# Patient Record
Sex: FEMALE | Race: WHITE | NOT HISPANIC OR LATINO | Employment: FULL TIME | URBAN - METROPOLITAN AREA
[De-identification: names, ages, dates, MRNs, and addresses within clinical notes are randomized per-mention and may not be internally consistent; named-entity substitution may affect disease eponyms.]

---

## 2017-01-05 ENCOUNTER — OFFICE VISIT (OUTPATIENT)
Dept: UROLOGY | Facility: CLINIC | Age: 42
End: 2017-01-05
Payer: COMMERCIAL

## 2017-01-05 VITALS
RESPIRATION RATE: 14 BRPM | HEART RATE: 70 BPM | WEIGHT: 184.31 LBS | BODY MASS INDEX: 28.93 KG/M2 | DIASTOLIC BLOOD PRESSURE: 78 MMHG | SYSTOLIC BLOOD PRESSURE: 112 MMHG | HEIGHT: 67 IN

## 2017-01-05 DIAGNOSIS — N39.46 MIXED INCONTINENCE URGE AND STRESS: Primary | ICD-10-CM

## 2017-01-05 LAB
BILIRUB SERPL-MCNC: NORMAL MG/DL
BLOOD URINE, POC: NORMAL
COLOR, POC UA: NORMAL
GLUCOSE UR QL STRIP: NORMAL
KETONES UR QL STRIP: NORMAL
LEUKOCYTE ESTERASE URINE, POC: NORMAL
NITRITE, POC UA: NORMAL
PH, POC UA: 7
PROTEIN, POC: NORMAL
SPECIFIC GRAVITY, POC UA: 1000
UROBILINOGEN, POC UA: NORMAL

## 2017-01-05 PROCEDURE — 99244 OFF/OP CNSLTJ NEW/EST MOD 40: CPT | Mod: 25,S$GLB,, | Performed by: UROLOGY

## 2017-01-05 PROCEDURE — 81002 URINALYSIS NONAUTO W/O SCOPE: CPT | Mod: S$GLB,,, | Performed by: UROLOGY

## 2017-01-05 PROCEDURE — 99999 PR PBB SHADOW E&M-EST. PATIENT-LVL IV: CPT | Mod: PBBFAC,,, | Performed by: UROLOGY

## 2017-01-05 RX ORDER — CIPROFLOXACIN 250 MG/1
500 TABLET, FILM COATED ORAL
Status: CANCELLED | OUTPATIENT
Start: 2017-01-05

## 2017-01-05 NOTE — H&P
"  Subjective:       Rad Fraser is a 41 y.o. female who is a new patient who was referred by Dr Osorio for evaluation of BOY.      Urinary Incontinence  Patient complains of urinary incontinence. This has been present for several years but worsening for several months. She leaks urine with coughing, laughing, sneezing, exercise, with urge. Patient describes the symptoms as nocturia 3 times per night, sensation of incomplete emptying of bladder and urine leakage with coughing/heavy physical activity. Factors associated with symptoms include none known. Evaluation to date includes none. Treatment to date includes Kegel exercises, which was not very effective. UUI less of an issue.       The following portions of the patient's history were reviewed and updated as appropriate: allergies, current medications, past family history, past medical history, past social history, past surgical history and problem list.    Review of Systems  Constitutional: no fever or chills  ENT: no nasal congestion or sore throat  Respiratory: no cough or shortness of breath  Cardiovascular: no chest pain or palpitations  Gastrointestinal: no nausea or vomiting, tolerating diet  Genitourinary: as per HPI  Hematologic/Lymphatic: no easy bruising or lymphadenopathy  Musculoskeletal: no arthralgias or myalgias  Skin: no rashes or lesions  Neurological: no seizures or tremors  Behavioral/Psych: no auditory or visual hallucinations       Objective:    Vitals:   Visit Vitals    /78    Pulse 70    Resp 14    Ht 5' 7" (1.702 m)    Wt 83.6 kg (184 lb 4.9 oz)    BMI 28.87 kg/m2       Physical Exam   General: well developed, well nourished in no acute distress  Head: normocephalic, atraumatic  Neck: supple, trachea midline, no obvious enlargement of thyroid  HEENT: EOMI, mucus membranes moist, sclera anicteric, no hearing impairment  Lungs: symmetric expansion, non-labored breathing  Cardiovascular: regular rate and rhythm, normal " pulses  Abdomen: soft, non tender, non distended, no palpable masses, no hepatosplenomegaly, no hernias, no CVA tenderness  Musculoskeletal: no peripheral edema, normal ROM in bilateral upper and lower extremities  Lymphatics: no cervical or inguinal lymphadenopathy  Skin: no rashes or lesions  Neuro: alert and oriented x 3, no gross deficits  Psych: normal judgment and insight, normal mood/affect and non-anxious  Genitourinary:   patient declined exam      Lab Review   Urine analysis today in clinic shows negative for all components     Lab Results   Component Value Date    WBC 5.02 06/15/2015    HGB 14.3 06/15/2015    HCT 41.9 06/15/2015    MCV 91 06/15/2015     06/15/2015     Lab Results   Component Value Date    CREATININE 0.8 06/15/2015    BUN 11 06/15/2015         Imaging  NA         Assessment/Plan:      1. Mixed incontinence urge and stress    - Discussed difference of UUI and BOY components. Reviewed etiology and workup of each.   - BOY: Kegels, PFPT, bulking agent, MUS.   - UUI: Behavioral changes, PFPT, anticholinergics. Botox/InterStim for refractory UUI.   - BOY much more bothersome. UUI rare.   - Likely candidate for MUS   - Cysto/UDS 1/31/17          Follow up in 2 weeks post-op

## 2017-01-05 NOTE — LETTER
January 5, 2017      Gena Osorio MD  [address]  [place] [address]           [facility]rology  [address] [place] [address] [address]  [facility] [address]  Phone: [phone]          Patient: Rad Fraser   MR Number: [id number]   YOB: 1975   Date of Visit: 1/5/2017       Dear Dr. Gena Osorio:    Thank you for referring Rad Fraser to me for evaluation. Attached you will find relevant portions of my assessment and plan of care.    If you have questions, please do not hesitate to call me. I look forward to following Rad Fraser along with you.    Sincerely,    Ruma Kowalski MD    Enclosure  CC:  No Recipients    If you would like to receive this communication electronically, please contact [email or web address] or [phone] to request more information on [employer] access.    For providers and/or their staff who would like to refer a patient to [facility], please contact us through our one-stop-shop provider referral line, [facility], at [phone].    If you feel you have received this communication in error or would no longer like to receive these types of communications, please e-mail [email or web address]

## 2017-01-05 NOTE — MR AVS SNAPSHOT
"    [facility]  [address] [address]  [place] [address]  Phone: [phone]                  Rad Fraser   2017 1:20 PM   Office Visit    Description:  Female : 1975   Provider:  Ruma Kowalski MD   Department:  [facility]           Reason for Visit     Urinary Incontinence           Diagnoses this Visit        Comments    Mixed incontinence urge and stress    -  Primary            To Do List           Goals (5 Years of Data)     None      Follow-Up and Disposition     Return in about 6 weeks (around 2017) for Post-op.      [facility]     [facility] Nurse Care Line -  Assistance  Registered nurses in the [facility] provide clinical advisement, health education, appointment booking, and other advisory services.  Call for this free service at [phone].             Medications           Message regarding Medications     Verify the changes and/or additions to your medication regime listed below are the same as discussed with your clinician today.  If any of these changes or additions are incorrect, please notify your healthcare provider.             Verify that the below list of medications is an accurate representation of the medications you are currently taking.  If none reported, the list may be blank. If incorrect, please contact your healthcare provider. Carry this list with you in case of emergency.           Current Medications     desvenlafaxine succinate (PRISTIQ) 100 MG Tb24 Take 100 mg by mouth once daily.    zolpidem (AMBIEN) 10 mg Tab Take 1 tablet (10 mg total) by mouth nightly as needed.           Clinical Reference Information           Vital Signs - Last Recorded  Most recent update: 2017  1:41 PM by Sherri Hernandez MA    BP Pulse Resp Ht Wt BMI    112/78 70 14 5' 7" (1.702 m) 83.6 kg (184 lb 4.9 oz) 28.87 kg/m2      Blood Pressure          Most Recent Value    BP  112/78      Allergies as of 2017     " No Known Allergies      Immunizations Administered on Date of Encounter - 1/5/2017     None

## 2017-02-03 RX ORDER — IBUPROFEN 100 MG/5ML
1000 SUSPENSION, ORAL (FINAL DOSE FORM) ORAL DAILY
Status: ON HOLD | COMMUNITY
End: 2022-10-11 | Stop reason: HOSPADM

## 2017-02-03 RX ORDER — ALBUTEROL SULFATE 90 UG/1
2 AEROSOL, METERED RESPIRATORY (INHALATION) EVERY 6 HOURS PRN
COMMUNITY

## 2017-02-03 NOTE — PRE ADMISSION SCREENING
Your  Procedure  is scheduled for ___Tuesday 2/7/17_________________.      Please report to SAME DAY SURGERY UNIT on the 2nd FLOOR at _______ a.m.  Use front door entrance before 6 a.m.        INSTRUCTIONS IMPORTANT!!!  ¨   You may eat and take regular morning medications before coming to the hospital.       X____  Prep instructions:    SHOWER    X____  No powder, lotions or creams to surgical area.  X____  You may wear only deodorant on the day of  procedure.  X____  Call MD for temperature above 101 degrees.                 I have read or had read and explained to me, and understand the above information.  Additional comments or instructions  The above instructions reviewed with Mrs. Fraser. I told her I would call her Monday 2/6/17 with her arrival time for the following day. She will be instructed to go to patient registration to register and make any necessary payments  prior to going [facility]. She verbalized understanding of the above.

## 2017-02-07 ENCOUNTER — HOSPITAL ENCOUNTER (OUTPATIENT)
Facility: HOSPITAL | Age: 42
Discharge: HOME | End: 2017-02-07
Attending: UROLOGY | Admitting: UROLOGY
Payer: COMMERCIAL

## 2017-02-07 ENCOUNTER — SURGERY (OUTPATIENT)
Age: 42
End: 2017-02-07

## 2017-02-07 VITALS
DIASTOLIC BLOOD PRESSURE: 70 MMHG | BODY MASS INDEX: 28.88 KG/M2 | OXYGEN SATURATION: 99 % | WEIGHT: 184 LBS | HEIGHT: 67 IN | TEMPERATURE: 97 F | HEART RATE: 68 BPM | SYSTOLIC BLOOD PRESSURE: 117 MMHG | RESPIRATION RATE: 18 BRPM

## 2017-02-07 DIAGNOSIS — N39.46 MIXED INCONTINENCE URGE AND STRESS: Primary | ICD-10-CM

## 2017-02-07 PROCEDURE — 25500020 PHARM REV CODE 255: Performed by: UROLOGY

## 2017-02-07 PROCEDURE — 36000706: Performed by: UROLOGY

## 2017-02-07 PROCEDURE — 51797 INTRAABDOMINAL PRESSURE TEST: CPT | Mod: 26,,, | Performed by: UROLOGY

## 2017-02-07 PROCEDURE — 51728 CYSTOMETROGRAM W/VP: CPT | Mod: 26,,, | Performed by: UROLOGY

## 2017-02-07 PROCEDURE — 76000 FLUOROSCOPY <1 HR PHYS/QHP: CPT | Mod: 26,59,, | Performed by: UROLOGY

## 2017-02-07 PROCEDURE — 36000707: Performed by: UROLOGY

## 2017-02-07 PROCEDURE — 52000 CYSTOURETHROSCOPY: CPT | Mod: 59,,, | Performed by: UROLOGY

## 2017-02-07 PROCEDURE — 25000003 PHARM REV CODE 250: Performed by: UROLOGY

## 2017-02-07 PROCEDURE — 71000015 HC POSTOP RECOV 1ST HR: Performed by: UROLOGY

## 2017-02-07 PROCEDURE — 51784 ANAL/URINARY MUSCLE STUDY: CPT | Mod: 26,51,, | Performed by: UROLOGY

## 2017-02-07 RX ORDER — ACETAMINOPHEN 325 MG/1
650 TABLET ORAL EVERY 4 HOURS PRN
Status: CANCELLED | OUTPATIENT
Start: 2017-02-07

## 2017-02-07 RX ORDER — HYDROCODONE BITARTRATE AND ACETAMINOPHEN 5; 325 MG/1; MG/1
1 TABLET ORAL EVERY 4 HOURS PRN
Status: CANCELLED | OUTPATIENT
Start: 2017-02-07

## 2017-02-07 RX ORDER — CIPROFLOXACIN 500 MG/1
500 TABLET ORAL
Status: COMPLETED | OUTPATIENT
Start: 2017-02-07 | End: 2017-02-07

## 2017-02-07 RX ORDER — ONDANSETRON 2 MG/ML
4 INJECTION INTRAMUSCULAR; INTRAVENOUS EVERY 12 HOURS PRN
Status: CANCELLED | OUTPATIENT
Start: 2017-02-07

## 2017-02-07 RX ORDER — SODIUM CHLORIDE, SODIUM LACTATE, POTASSIUM CHLORIDE, CALCIUM CHLORIDE 600; 310; 30; 20 MG/100ML; MG/100ML; MG/100ML; MG/100ML
INJECTION, SOLUTION INTRAVENOUS CONTINUOUS
Status: DISCONTINUED | OUTPATIENT
Start: 2017-02-07 | End: 2017-02-07 | Stop reason: HOSPADM

## 2017-02-07 RX ORDER — LIDOCAINE HYDROCHLORIDE 10 MG/ML
1 INJECTION, SOLUTION EPIDURAL; INFILTRATION; INTRACAUDAL; PERINEURAL ONCE
Status: DISCONTINUED | OUTPATIENT
Start: 2017-02-07 | End: 2017-02-07 | Stop reason: HOSPADM

## 2017-02-07 RX ORDER — LIDOCAINE HYDROCHLORIDE 20 MG/ML
JELLY TOPICAL
Status: DISCONTINUED | OUTPATIENT
Start: 2017-02-07 | End: 2017-02-07 | Stop reason: HOSPADM

## 2017-02-07 RX ADMIN — IOHEXOL 30 ML: 300 INJECTION, SOLUTION INTRAVENOUS at 02:02

## 2017-02-07 RX ADMIN — CIPROFLOXACIN HYDROCHLORIDE 500 MG: 500 TABLET, FILM COATED ORAL at 01:02

## 2017-02-07 RX ADMIN — LIDOCAINE HYDROCHLORIDE 10 ML: 20 JELLY TOPICAL at 02:02

## 2017-02-07 NOTE — DISCHARGE INSTRUCTIONS
Expect blood and/or burning with urination. Drink plenty of fluids.    Okay to take over-the-counter Azo Standard if needed for burning with urination.

## 2017-02-07 NOTE — IP AVS SNAPSHOT
[address] [address] [place] [address]  Phone: [phone]           Patient Discharge Instructions     Our goal is to set you up for success. This packet includes information on your condition, medications, and your home care. It will help you to care for yourself so you don't get sicker and need to go back to the hospital.     Please ask your nurse if you have any questions.        There are many details to remember when preparing to leave the hospital. Here is what you will need to do:    1. Take your medicine. If you are prescribed medications, review your Medication List in the following pages. You may have new medications to  at the pharmacy and others that you'll need to stop taking. Review the instructions for how and when to take your medications. Talk with your doctor or nurses if you are unsure of what to do.     2. Go to your follow-up appointments. Specific follow-up information is listed in the following pages. Your may be contacted by a transition nurse or clinical provider about future appointments. Be sure we have all of the phone numbers to reach you, if needed. Please contact your provider's office if you are unable to make an appointment.     3. Watch for warning signs. Your doctor or nurse will give you detailed warning signs to watch for and when to call for assistance. These instructions may also include educational information about your condition. If you experience any of warning signs to your health, call your doctor.               [facility]  Unless otherwise directed by your provider, please contact [facility], our nurse care line that is available for 24/7 assistance.     [phone] (toll-free)    Registered nurses in the [facility] provide clinical advisement, health education, appointment booking, and other advisory services.                    ** Verify the list of medication(s) below is accurate and up to date.  Carry this with you in case of emergency. If your medications have changed, please notify your healthcare provider.             Medication List      CONTINUE taking these medications        Additional Info                      biotin 2,500 mcg Tab   Refills:  0   Dose:  5000 mcg    Instructions:  Take 5,000 mcg by mouth once daily.     Begin Date    AM    Noon    PM    Bedtime       desvenlafaxine succinate 100 MG Tb24   Commonly known as:  PRISTIQ   Quantity:  30 tablet   Refills:  5   Dose:  100 mg    Instructions:  Take 100 mg by mouth once daily.     Begin Date    AM    Noon    PM    Bedtime       M-VIT ORAL   Refills:  0   Dose:  1 tablet    Instructions:  Take 1 tablet by mouth once daily.     Begin Date    AM    Noon    PM    Bedtime       PROAIR HFA 90 mcg/actuation inhaler   Refills:  0   Dose:  2 puff   Generic drug:  albuterol    Instructions:  Inhale 2 puffs into the lungs every 6 (six) hours as needed for Wheezing. Rescue     Begin Date    AM    Noon    PM    Bedtime       VITAMIN C 1000 MG tablet   Refills:  0   Dose:  1000 mg   Generic drug:  ascorbic acid (vitamin C)    Instructions:  Take 1,000 mg by mouth once daily.     Begin Date    AM    Noon    PM    Bedtime       zolpidem 10 mg Tab   Commonly known as:  AMBIEN   Quantity:  30 tablet   Refills:  0   Dose:  10 mg    Instructions:  Take 1 tablet (10 mg total) by mouth nightly as needed.     Begin Date    AM    Noon    PM    Bedtime                  Please bring to all follow up appointments:    1. A copy of your discharge instructions.  2. All medicines you are currently taking in their original bottles.  3. Identification and insurance card.    Please arrive 15 minutes ahead of scheduled appointment time.    Please call 24 hours in advance if you must reschedule your appointment and/or time.        Your Scheduled Appointments     Feb 21, 2017  9:20 AM CST   Established Patient Visit with Ruma Kowalski MD   [facility] ([facility])  "   [address] [facility] [address]  [address]  [facility] [address]   [phone]              Follow-up Information     Follow up with Ruma Kowalski MD In 3 weeks.    Specialty:  Urology    Why:  For post-op follow up    Contact information:    [address] [facility] [address]   [address]  [facility] [address]  [phone]          Discharge Instructions     Future Orders    Activity as tolerated     Call MD for:  difficulty breathing, headache or visual disturbances     Call MD for:  persistent nausea and vomiting     Call MD for:  severe uncontrolled pain     Call MD for:  temperature >100.4     Diet general     Questions:    Total calories:      Fat restriction, if any:      Protein restriction, if any:      Na restriction, if any:      Fluid restriction:      Additional restrictions:      No dressing needed         Discharge Instructions       Expect blood and/or burning with urination. Drink plenty of fluids.    Okay to take over-the-counter Azo Standard if needed for burning with urination.     Discharge References/Attachments     URODYNAMICS STUDIES, WHAT ARE (ENGLISH)        Primary Diagnosis     Your primary diagnosis was:  Loss Of Bladder Control      Admission Information     Date & Time Provider Department CSN    2/7/2017 12:55 PM Ruma Kowalski MD [facility] [id number]      Care Providers     Provider Role Specialty Primary office phone    Ruma Kowalski MD Attending Provider Urology [phone]    Ruma Kowalski MD Surgeon  Urology [phone]      Your Vitals Were     BP Pulse Temp Resp Height Weight    117/70 68 97.4 °F (36.3 °C) (Oral) 18 5' 7" (1.702 m) 83.5 kg (184 lb)    Last Period SpO2 BMI          01/30/2017 (Exact Date) 99% 28.82 kg/m2        Recent Lab Values        6/15/2015                           9:01 AM           A1C 5.3                       Allergies as of 2/7/2017        Reactions    No Known Allergies       Advance Directives     An advance directive is a " document which, in the event you are no longer able to make decisions for yourself, tells your healthcare team what kind of treatment you do or do not want to receive, or who you would like to make those decisions for you.  If you do not currently have an advance directive, [facility] encourages you to create one.  For more information call:  [phone]), [phone] ([phone]),  or log on to [email or web address].        Language Assistance Services     ATTENTION: Language assistance services are available, free of charge. Please call [phone].      ATENCIÓN: Si habla jeffery, tiene a beltran disposición servicios gratuitos de asistencia lingüística. Llame al [phone].     CHÚ Ý: N?u b?n nói Ti?ng Vi?t, có các d?ch v? h? tr? ngôn ng? mi?n phí dành cho b?n. G?i s? [phone].         [facility] complies with applicable Federal civil rights laws and does not discriminate on the basis of race, color, national origin, age, disability, or sex.

## 2017-02-07 NOTE — OP NOTE
[facility]  Surgery Department  Operative Note      Date of Procedure:  2017 2:00 PM     Procedure:   1. Complex urodynamics with fluoroscopy  2. Cystoscopy    Surgeon(s) and Role:     * Ruma Kowalski MD - Primary    Assisting Surgeon: None    Pre-Operative Diagnosis: Mixed incontinence urge and stress [788.33]    Post-Operative Diagnosis: Post-Op Diagnosis Codes:     * Mixed incontinence urge and stress [788.33]    Indication for procedure:  Rad Fraser is a 41 y.o. female who is a new patient who was referred by Dr Osorio for evaluation of BOY.      Urinary Incontinence  Patient complains of urinary incontinence. This has been present for several years but worsening for several months. She leaks urine with coughing, laughing, sneezing, exercise, with urge. Patient describes the symptoms as nocturia 3 times per night, sensation of incomplete emptying of bladder and urine leakage with coughing/heavy physical activity. Factors associated with symptoms include none known. Evaluation to date includes none. Treatment to date includes Kegel exercises, which was not very effective. UUI less of an issue.       Description of procedure:  The patient was brought to the urodynamics suite and transferred to the urodynamics chair. Full timeout procedures were performed identifying the correct patient and procedure. Appropriate antibiotics with PO ciprofloxacin were given prior to commencement of surgery A 16-Serbian red rubber catheter was then placed per urethra after genitals were prepped with Betadine solution to remove any residual urine in bladder.  P1 and P2 catheters were placed in the urethra into the bladder and rectally respectively. EMG senses were placed in the appropriate location on perineum and left leg. The bladder was filled at an appropriately slow rate.    PVR pre-procedure: 450mL (2-3 hours after last void, not true PVR)    Filling phase:  Rate of fillin-30 mL/min  First  senation: 112mL  First desire: 227mL  Strong desire: 296mL  Capacity: 808mL  Permission to void given at 697mL    Stress maneuvers (Valsalva and cough) at 150 and 350mL: large leak  VLLP: 85cm H2O  Compliance: normal  ALLP: NA  Involuntary bladder contraction: none    Voiding phase:  Bladder contraction: present, delay of void after bladder contraction  Coordination: EMG flaring  Flow rate (max): 36.0mL/s  Flow rate (avg): 12.6mL/s  Pdet (max flow): 61.0cm H2O  Max Pdet: 76.5cm H2O  Voided volume: 723mL  PVR: 84mL (suspect artifact 2/2 urine missing collection bin)    Fluoroscopy:  Bladder wall: smooth  Voiding: open bladder neck  Vesicoureteral reflux: none  Radiographic PVR: empty bladder      Cystoscopy:  At the conclusion of the urodynamics procedure, P1 and P2 catheters as well as EMG sensors were removed. The patient then was placed in the dorsal lithotomy position and prepped and draped in the usual sterile fashion. Uro-Jet lidocaine was placed in the urethra for aniya-operative analgesia. A 16-Mongolian flexible cystoscope was passed per urethra into the bladder. Full cystoscopy was performed systematically to inspect all aspects of the bladder wall. Retroflexion of the cystoscope was done to inspect the bladder neck. Bilateral UOs were visualized. Urethra was carefully inspected upon removal of cystoscope. The procedure was terminated. The patient tolerated the procedure well.    Urethra: normal  Bladder mucosa: smooth, no lesions/mass  Trabeculation: none  UOs: heaping UOs, orthotopic location      Anesthesia: Local    Findings of the Procedure: Stable filling. No IBC/DO. ++BOY noted with cough. Large capacity bladder with some hesitancy likely due to testing situation. Good bladder contraction with normal flow. Complete emptying.    Complications: none    Estimated Blood Loss (EBL): 0cc          Drains: none    Specimens: none     Attestation: I was present the entirety of the procedure.            Disposition:  Patient is stable and deemed appropriate for discharge home. The patient will follow up in 2-3 weeks.    Recommendations: Stress urinary incontinence - recommend MUS (other options PFPT, pessary, bulking agent).        Discharge Note    SUMMARY     Admit Date:  02/07/2017 3:23 PM    Discharge Date and Time:  02/07/2017 3:23 PM    Hospital Course (synopsis of major diagnoses, care, treatment, and services provided during the course of the hospital stay): Uncomplicated cysto/UDS.     Final Diagnosis: Post-Op Diagnosis Codes:     * Mixed incontinence urge and stress [788.33]    Disposition: Home or Self Care    Follow Up/Patient Instructions:   Expect blood and burning with urination. Drink plenty of fluids.    Medications:  Reconciled Home Medications:   Current Discharge Medication List      CONTINUE these medications which have NOT CHANGED    Details   albuterol (PROAIR HFA) 90 mcg/actuation inhaler Inhale 2 puffs into the lungs every 6 (six) hours as needed for Wheezing. Rescue      ascorbic acid, vitamin C, (VITAMIN C) 1000 MG tablet Take 1,000 mg by mouth once daily.      biotin 2,500 mcg Tab Take 5,000 mcg by mouth once daily.      desvenlafaxine succinate (PRISTIQ) 100 MG Tb24 Take 100 mg by mouth once daily.  Qty: 30 tablet, Refills: 5    Associated Diagnoses: Mixed anxiety and depressive disorder      PV W-O RAAD/FERROUS FUMARATE/FA (M-VIT ORAL) Take 1 tablet by mouth once daily.      zolpidem (AMBIEN) 10 mg Tab Take 1 tablet (10 mg total) by mouth nightly as needed.  Qty: 30 tablet, Refills: 0    Associated Diagnoses: Insomnia, unspecified type             Discharge Procedure Orders  Diet general     Activity as tolerated     Call MD for:  temperature >100.4     Call MD for:  persistent nausea and vomiting     Call MD for:  severe uncontrolled pain     Call MD for:  difficulty breathing, headache or visual disturbances     No dressing needed     Follow-up Information     Follow up with  Ruma Kowalski MD In 2 weeks.    Specialty:  Urology    Why:  For post-op follow up    Contact information:    [address] [place] [id number] [phone]

## 2017-02-20 ENCOUNTER — OFFICE VISIT (OUTPATIENT)
Dept: UROLOGY | Facility: CLINIC | Age: 42
End: 2017-02-20
Payer: COMMERCIAL

## 2017-02-20 VITALS
BODY MASS INDEX: 28.89 KG/M2 | HEART RATE: 60 BPM | DIASTOLIC BLOOD PRESSURE: 76 MMHG | RESPIRATION RATE: 16 BRPM | WEIGHT: 184.06 LBS | HEIGHT: 67 IN | SYSTOLIC BLOOD PRESSURE: 118 MMHG

## 2017-02-20 DIAGNOSIS — N39.46 MIXED INCONTINENCE URGE AND STRESS: Primary | ICD-10-CM

## 2017-02-20 PROCEDURE — 99999 PR PBB SHADOW E&M-EST. PATIENT-LVL IV: CPT | Mod: PBBFAC,,, | Performed by: UROLOGY

## 2017-02-20 PROCEDURE — 99214 OFFICE O/P EST MOD 30 MIN: CPT | Mod: S$GLB,,, | Performed by: UROLOGY

## 2017-02-20 NOTE — H&P
"  Subjective:       Rad Fraser is a 41 y.o. female who is an established patient who was referred by Dr Osorio for evaluation of BOY.      Urinary Incontinence  Patient complains of urinary incontinence. This has been present for several years but worsening for several months. She leaks urine with coughing, laughing, sneezing, exercise, with urge. Patient describes the symptoms as nocturia 3 times per night, sensation of incomplete emptying of bladder and urine leakage with coughing/heavy physical activity. Factors associated with symptoms include none known. Evaluation to date includes none. Treatment to date includes Kegel exercises, which was not very effective. UUI less of an issue.     Cysto/UDS 2/7/17:  Findings of the Procedure: Stable filling. No IBC/DO. ++BOY noted with cough. Large capacity bladder with some hesitancy likely due to testing situation. Good bladder contraction with normal flow. Complete emptying.  Recommendations: Stress urinary incontinence - recommend MUS (other options PFPT, pessary, bulking agent).      The following portions of the patient's history were reviewed and updated as appropriate: allergies, current medications, past family history, past medical history, past social history, past surgical history and problem list.    Review of Systems  Constitutional: no fever or chills  ENT: no nasal congestion or sore throat  Respiratory: no cough or shortness of breath  Cardiovascular: no chest pain or palpitations  Gastrointestinal: no nausea or vomiting, tolerating diet  Genitourinary: as per HPI  Hematologic/Lymphatic: no easy bruising or lymphadenopathy  Musculoskeletal: no arthralgias or myalgias  Skin: no rashes or lesions  Neurological: no seizures or tremors  Behavioral/Psych: no auditory or visual hallucinations       Objective:    Vitals:   Visit Vitals    /76    Pulse 60    Resp 16    Ht 5' 7" (1.702 m)    Wt 83.5 kg (184 lb 1.4 oz)    LMP 01/30/2017 (Exact " Date)    BMI 28.83 kg/m2       Physical Exam   General: well developed, well nourished in no acute distress  Head: normocephalic, atraumatic  Neck: supple, trachea midline, no obvious enlargement of thyroid  HEENT: EOMI, mucus membranes moist, sclera anicteric, no hearing impairment  Lungs: symmetric expansion, non-labored breathing  Cardiovascular: regular rate and rhythm, normal pulses  Abdomen: soft, non tender, non distended, no palpable masses, no hepatosplenomegaly, no hernias, no CVA tenderness  Musculoskeletal: no peripheral edema, normal ROM in bilateral upper and lower extremities  Lymphatics: no cervical or inguinal lymphadenopathy  Skin: no rashes or lesions  Neuro: alert and oriented x 3, no gross deficits  Psych: normal judgment and insight, normal mood/affect and non-anxious  Genitourinary:   patient declined exam      Lab Review   Urine analysis today in clinic shows negative for all components     Lab Results   Component Value Date    WBC 5.02 06/15/2015    HGB 14.3 06/15/2015    HCT 41.9 06/15/2015    MCV 91 06/15/2015     06/15/2015     Lab Results   Component Value Date    CREATININE 0.8 06/15/2015    BUN 11 06/15/2015         Imaging  NA         Assessment/Plan:      1. Mixed incontinence urge and stress    - Discussed difference of UUI and BOY components. Reviewed etiology and workup of each.   - BOY: Kegels, PFPT, bulking agent, MUS.   - UUI: Behavioral changes, PFPT, anticholinergics. Botox/InterStim for refractory UUI.   - BOY much more bothersome. UUI rare.   - Likely candidate for MUS   - Cysto/UDS 1/31/17 - BOY confirmed     - Discussed etiology and treatment of BOY. I explained treatment options of Kegel exercises, pelvic floor physical therapy, and MUS (synthetic vs autologous). We discussed the FDA statement on pelvic mesh, although sling is not included in the statement. I explained the surgical procedure including intra- and post-op expectations. She understands post-op  instructions including no heavy lifting and pelvic rest for 6 weeks. I also explained the risks of the procedure including, but not limited to, bleeding, infection, urinary retention, mesh extrusion/erosion, damage to urethra/bladder/ureters, dyspareunia, and pelvic pain. She understands risks, benefits, and alternatives and agrees to proceed with mid-urethral sling.           Follow up in 2 weeks post-op

## 2017-02-20 NOTE — MR AVS SNAPSHOT
[facility]  [address]  [address]  [place] [address]  Phone: [phone]                  Rad Fraser   2017 2:20 PM   Office Visit    Description:  Female : 1975   Provider:  Ruma Kowalski MD   Department:  [facility]           Reason for Visit     Follow-up           Diagnoses this Visit        Comments    Mixed incontinence urge and stress    -  Primary            To Do List           Goals (5 Years of Data)     None      Follow-Up and Disposition     Return in about 10 weeks (around 2017) for Post-op.      [facility]     [facility] Nurse Care Line -  Assistance  Registered nurses in the [facility] provide clinical advisement, health education, appointment booking, and other advisory services.  Call for this free service at [phone].             Medications           Message regarding Medications     Verify the changes and/or additions to your medication regime listed below are the same as discussed with your clinician today.  If any of these changes or additions are incorrect, please notify your healthcare provider.             Verify that the below list of medications is an accurate representation of the medications you are currently taking.  If none reported, the list may be blank. If incorrect, please contact your healthcare provider. Carry this list with you in case of emergency.           Current Medications     albuterol (PROAIR HFA) 90 mcg/actuation inhaler Inhale 2 puffs into the lungs every 6 (six) hours as needed for Wheezing. Rescue    ascorbic acid, vitamin C, (VITAMIN C) 1000 MG tablet Take 1,000 mg by mouth once daily.    biotin 2,500 mcg Tab Take 5,000 mcg by mouth once daily.    desvenlafaxine succinate (PRISTIQ) 100 MG Tb24 Take 100 mg by mouth once daily.    PV W-O RAAD/FERROUS FUMARATE/FA (M-VIT ORAL) Take 1 tablet by mouth once daily.    zolpidem (AMBIEN) 10 mg Tab Take 1 tablet (10 mg total) by  "mouth nightly as needed.           Clinical Reference Information           Your Vitals Were     BP Pulse Resp Height Weight Last Period    118/76 60 16 5' 7" (1.702 m) 83.5 kg (184 lb 1.4 oz) 01/30/2017 (Exact Date)    BMI                28.83 kg/m2          Blood Pressure          Most Recent Value    BP  118/76      Allergies as of 2/20/2017     No Known Allergies      Immunizations Administered on Date of Encounter - 2/20/2017     None      Language Assistance Services     ATTENTION: Language assistance services are available, free of charge. Please call [phone].      ATENCIÓN: Si habla español, tiene a beltran disposición servicios gratuitos de asistencia lingüística. Llame al [phone].     LESLY Ý: N?u b?n nói Ti?ng Vi?t, có các d?ch v? h? tr? ngôn ng? mi?n phí dành cho b?n. G?i s? [phone].         [facility] complies with applicable Federal civil rights laws and does not discriminate on the basis of race, color, national origin, age, disability, or sex.        "

## 2017-03-02 ENCOUNTER — PATIENT MESSAGE (OUTPATIENT)
Dept: UROLOGY | Facility: CLINIC | Age: 42
End: 2017-03-02

## 2017-03-03 NOTE — TELEPHONE ENCOUNTER
Please check with pt how long she will be out of work for the paperwork. She is restricted to no heavy lifting for 6 weeks post-op

## 2017-03-03 NOTE — TELEPHONE ENCOUNTER
Left Voicemail for patient to return my call. When I hear back i will let you know. I will also send an email to her as well.

## 2017-03-06 ENCOUNTER — PATIENT MESSAGE (OUTPATIENT)
Dept: UROLOGY | Facility: CLINIC | Age: 42
End: 2017-03-06

## 2017-03-07 NOTE — TELEPHONE ENCOUNTER
Patient states that she wants to be out for two weeks then go back to light duty for the remainder (6 weeks post-op). Please let me know when paperwork is ready to be faxed. Thank you.

## 2017-04-18 ENCOUNTER — HOSPITAL ENCOUNTER (OUTPATIENT)
Dept: PREADMISSION TESTING | Facility: HOSPITAL | Age: 42
Discharge: HOME | End: 2017-04-18
Attending: UROLOGY
Payer: COMMERCIAL

## 2017-04-18 VITALS
HEART RATE: 70 BPM | WEIGHT: 194 LBS | HEIGHT: 67 IN | RESPIRATION RATE: 18 BRPM | BODY MASS INDEX: 30.45 KG/M2 | OXYGEN SATURATION: 100 % | TEMPERATURE: 98 F

## 2017-04-18 DIAGNOSIS — Z01.818 PRE-OP TESTING: Primary | ICD-10-CM

## 2017-04-18 LAB
B-HCG UR QL: NEGATIVE
BASOPHILS # BLD AUTO: 0.02 K/UL
BASOPHILS NFR BLD: 0.4 %
DIFFERENTIAL METHOD: NORMAL
EOSINOPHIL # BLD AUTO: 0.2 K/UL
EOSINOPHIL NFR BLD: 4 %
ERYTHROCYTE [DISTWIDTH] IN BLOOD BY AUTOMATED COUNT: 12.5 %
HCT VFR BLD AUTO: 40.7 %
HGB BLD-MCNC: 13.6 G/DL
LYMPHOCYTES # BLD AUTO: 1.4 K/UL
LYMPHOCYTES NFR BLD: 29.3 %
MCH RBC QN AUTO: 30.4 PG
MCHC RBC AUTO-ENTMCNC: 33.4 %
MCV RBC AUTO: 91 FL
MONOCYTES # BLD AUTO: 0.3 K/UL
MONOCYTES NFR BLD: 6.8 %
NEUTROPHILS # BLD AUTO: 2.8 K/UL
NEUTROPHILS NFR BLD: 59.5 %
PLATELET # BLD AUTO: 248 K/UL
PMV BLD AUTO: 10.6 FL
RBC # BLD AUTO: 4.47 M/UL
WBC # BLD AUTO: 4.74 K/UL

## 2017-04-18 PROCEDURE — 85025 COMPLETE CBC W/AUTO DIFF WBC: CPT

## 2017-04-18 PROCEDURE — 81025 URINE PREGNANCY TEST: CPT

## 2017-04-18 PROCEDURE — 36415 COLL VENOUS BLD VENIPUNCTURE: CPT

## 2017-04-18 NOTE — DISCHARGE INSTRUCTIONS
Your surgery is scheduled for _____4/21/2017_______________.    Call [phone] between 2 p.m. and 5 p.m. on   ____4/20/2017___________ to find out your arrival time for the day of your surgery.      Please report to SAME DAY SURGERY UNIT on the 2nd FLOOR at _______ a.m.  Use front door entrance. The doors open at 0530 am.      INSTRUCTIONS IMPORTANT!!!  ¨ Do not eat or drink after 12 midnight-including water. OK to brush teeth, no   gum, candy or mints!    ¨ Take only these medicines with a small swallow of water-morning of surgery.          Albuterol as needed.        _x___  Prep instructions:    SHOWER   OTHER  ______________________  _x___  Please shower using Hibiclens soap the night before AND  the morning of                  your surgery/procedure. Do not use Hibiclens on your face or genitals               If your surgery is around your belly button (Navel) be sure to wash inside your            belly button also. Rinse hibiclens off completely.  _x___  No shaving of procedural area at least 4-5 days before surgery due to                        increased risk of skin irritation and/or possible infection.  _x___  Do not wear makeup, including mascara. WEARING EYE MAKEUP MAY                   LEAD TO SERIOUS EYE INJURY during surgery.  _x___  No powder, lotions or creams to your body.  _x___  You may wear only deodorant on the day of surgery.  _x___  Please remove all jewelry, including piercings and leave at home.  _x___  No money or valuables needed. Please leave at home.  You may bring your           cell phone.  __x__  Please bring any documents given by your doctor.  _x___  If going home the same day, arrange for a ride home. You will not be able to   drive if Anesthesia was used.    __x_  Wear loose fitting clothing. Allow for dressings, bandages.  __x__  Stop Aspirin, Ibuprofen, Motrin and Aleve at least 3-5 days before                       surgery, unless otherwise instructed by your doctor, or the  nurse.              You MAY use Tylenol/acetaminophen until day of surgery.    _x___  Call MD for temperature above 101 degrees.        _x___ Stop taking any Fish Oil supplement or any Vitamins that contain Vitamin                  E at least 5 days prior to surgery.          I have read or had read and explained to me, and understand the above information.  Additional comments or instructions:Please call   [phone] if you have any questions regarding the instructions above.

## 2017-04-18 NOTE — IP AVS SNAPSHOT
[place] [place] [address]  Phone: [phone]           Patient Discharge Instructions  Our goal is to set you up for success. This packet includes information on your condition, medications, and your home care. It will help you care for yourself to prevent having to return to the hospital.     Please ask your nurse if you have any questions.      There are many details to remember when preparing for your surgery. Here is what you will need to do, please ask your nurse if there are more specific instructions and if you have any questions:    1. Before procedure Do not smoke or drink alcoholic beverages 24 hours prior to your procedure. Do not eat or drink anything 8 hours before your procedure - this includes gum, mints, and candy.     2. Day of procedure Please remove all jewelry for the procedure. If you wear contact lenses, dentures, hearing aids or glasses, bring a container to put them in during your surgery and give to a family member.  If your doctor has scheduled you for an overnight stay, bring a small overnight bag with any personal items that you need.      3. After procedure  Make arrangements in advance for transportation home by a responsible adult. It is not safe to drive a vehicle during the 24 hours following surgery.     PLEASE NOTE: You may be contacted the day before your surgery to confirm your surgery date and arrival time. The Surgery schedule has many variables which may affect the time of your surgery case. Family members should be available if your surgery time changes.           [facility]  Unless otherwise directed by your provider, please   contact [facility], our nurse care line   that is available for 24/7 assistance.     [phone] (toll-free)     Registered nurses in the [facility]   provide: appointment scheduling, clinical advisement, health education, and other advisory services.                  ** Verify  the list of medication(s) below is accurate and up to date. Carry this with you in case of emergency. If your medications have changed, please notify your healthcare provider.             Medication List      TAKE these medications        Additional Info                      biotin 2,500 mcg Tab   Refills:  0   Dose:  5000 mcg    Instructions:  Take 5,000 mcg by mouth once daily.     Begin Date    AM    Noon    PM    Bedtime       desvenlafaxine succinate 100 MG Tb24   Commonly known as:  PRISTIQ   Quantity:  30 tablet   Refills:  5   Dose:  100 mg    Instructions:  Take 100 mg by mouth once daily.     Begin Date    AM    Noon    PM    Bedtime       M-VIT ORAL   Refills:  0   Dose:  1 tablet    Instructions:  Take 1 tablet by mouth once daily.     Begin Date    AM    Noon    PM    Bedtime       PROAIR HFA 90 mcg/actuation inhaler   Refills:  0   Dose:  2 puff   Generic drug:  albuterol    Instructions:  Inhale 2 puffs into the lungs every 6 (six) hours as needed for Wheezing. Rescue     Begin Date    AM    Noon    PM    Bedtime       VITAMIN C 1000 MG tablet   Refills:  0   Dose:  1000 mg   Generic drug:  ascorbic acid (vitamin C)    Instructions:  Take 1,000 mg by mouth once daily.     Begin Date    AM    Noon    PM    Bedtime       zolpidem 10 mg Tab   Commonly known as:  AMBIEN   Quantity:  30 tablet   Refills:  0   Dose:  10 mg    Instructions:  Take 1 tablet (10 mg total) by mouth nightly as needed.     Begin Date    AM    Noon    PM    Bedtime                  Please bring to all follow up appointments:    1. A copy of your discharge instructions.  2. All medicines you are currently taking in their original bottles.  3. Identification and insurance card.    Please arrive 15 minutes ahead of scheduled appointment time.    Please call 24 hours in advance if you must reschedule your appointment and/or time.        Your Scheduled Appointments     May 01, 2017 10:20 AM CDT   Post OP with Ruma Kowalski MD    [facility] ([facility])    [address]  [place] [id number] [phone]              Your Future Surgeries/Procedures     Apr 21, 2017   Surgery with Ruma Kowalski MD   [facility])    [place] [id number] [phone]                  Discharge Instructions         Your surgery is scheduled for _____4/21/2017_______________.    Call [phone] between 2 p.m. and 5 p.m. on   ____4/20/2017___________ to find out your arrival time for the day of your surgery.      Please report to SAME DAY SURGERY UNIT on the 2nd FLOOR at _______ a.m.  Use front door entrance. The doors open at 0530 am.      INSTRUCTIONS IMPORTANT!!!  ¨ Do not eat or drink after 12 midnight-including water. OK to brush teeth, no   gum, candy or mints!    ¨ Take only these medicines with a small swallow of water-morning of surgery.          Albuterol as needed.        _x___  Prep instructions:    SHOWER   OTHER  ______________________  _x___  Please shower using Hibiclens soap the night before AND  the morning of                  your surgery/procedure. Do not use Hibiclens on your face or genitals               If your surgery is around your belly button (Navel) be sure to wash inside your            belly button also. Rinse hibiclens off completely.  _x___  No shaving of procedural area at least 4-5 days before surgery due to                        increased risk of skin irritation and/or possible infection.  _x___  Do not wear makeup, including mascara. WEARING EYE MAKEUP MAY                   LEAD TO SERIOUS EYE INJURY during surgery.  _x___  No powder, lotions or creams to your body.  _x___  You may wear only deodorant on the day of surgery.  _x___  Please remove all jewelry, including piercings and leave at home.  _x___  No money or valuables needed. Please leave at home.  You may bring your           cell phone.  __x__  Please bring  "any documents given by your doctor.  _x___  If going home the same day, arrange for a ride home. You will not be able to   drive if Anesthesia was used.    __x_  Wear loose fitting clothing. Allow for dressings, bandages.  __x__  Stop Aspirin, Ibuprofen, Motrin and Aleve at least 3-5 days before                       surgery, unless otherwise instructed by your doctor, or the nurse.              You MAY use Tylenol/acetaminophen until day of surgery.    _x___  Call MD for temperature above 101 degrees.        _x___ Stop taking any Fish Oil supplement or any Vitamins that contain Vitamin                  E at least 5 days prior to surgery.          I have read or had read and explained to me, and understand the above information.  Additional comments or instructions:Please call   [phone] if you have any questions regarding the instructions above.                 Admission Information     Date & Time Provider Department CSN    4/18/2017 10:00 AM Ruma Kowalski MD [facility] [id number]      Care Providers     Provider Role Specialty Primary office phone    Ruma Kowalski MD Attending Provider Urology [phone]      Your Vitals Were     Pulse Temp Resp Height Weight Last Period    70 97.6 °F (36.4 °C) (Oral) 18 5' 7" (1.702 m) 88 kg (194 lb) 04/16/2017    SpO2 BMI             100% 30.38 kg/m2         Recent Lab Values        6/15/2015                           9:01 AM           A1C 5.3                       Allergies as of 4/18/2017        Reactions    No Known Allergies       Advance Directives     An advance directive is a document which, in the event you are no longer able to make decisions for yourself, tells your healthcare team what kind of treatment you do or do not want to receive, or who you would like to make those decisions for you.  If you do not currently have an advance directive, [facility] encourages you to create one.  For more information call:  [phone] " [phone]), [phone] ([phone]),  or log on to [email or web address].        Language Assistance Services     ATTENTION: Language assistance services are available, free of charge. Please call [phone].      ATENCIÓN: Si habla español, tiene a beltran disposición servicios gratuitos de asistencia lingüística. Llame al [phone].     CHÚ Ý: N?u b?n nói Ti?ng Vi?t, có các d?ch v? h? tr? ngôn ng? mi?n phí dành cho b?n. G?i s? [phone].         [facility] complies with applicable Federal civil rights laws and does not discriminate on the basis of race, color, national origin, age, disability, or sex.

## 2017-04-21 ENCOUNTER — ANESTHESIA EVENT (OUTPATIENT)
Dept: SURGERY | Facility: HOSPITAL | Age: 42
End: 2017-04-21
Payer: COMMERCIAL

## 2017-04-21 ENCOUNTER — SURGERY (OUTPATIENT)
Age: 42
End: 2017-04-21

## 2017-04-21 ENCOUNTER — HOSPITAL ENCOUNTER (OUTPATIENT)
Facility: HOSPITAL | Age: 42
Discharge: HOME | End: 2017-04-21
Attending: UROLOGY | Admitting: UROLOGY
Payer: COMMERCIAL

## 2017-04-21 ENCOUNTER — ANESTHESIA (OUTPATIENT)
Dept: SURGERY | Facility: HOSPITAL | Age: 42
End: 2017-04-21
Payer: COMMERCIAL

## 2017-04-21 VITALS
TEMPERATURE: 97 F | DIASTOLIC BLOOD PRESSURE: 76 MMHG | BODY MASS INDEX: 30.45 KG/M2 | HEIGHT: 67 IN | SYSTOLIC BLOOD PRESSURE: 122 MMHG | HEART RATE: 72 BPM | OXYGEN SATURATION: 100 % | WEIGHT: 194 LBS | RESPIRATION RATE: 16 BRPM

## 2017-04-21 DIAGNOSIS — N39.46 MIXED INCONTINENCE URGE AND STRESS: ICD-10-CM

## 2017-04-21 PROCEDURE — 37000008 HC ANESTHESIA 1ST 15 MINUTES: Performed by: UROLOGY

## 2017-04-21 PROCEDURE — 63600175 PHARM REV CODE 636 W HCPCS: Performed by: NURSE ANESTHETIST, CERTIFIED REGISTERED

## 2017-04-21 PROCEDURE — 71000033 HC RECOVERY, INTIAL HOUR: Performed by: UROLOGY

## 2017-04-21 PROCEDURE — 25000003 PHARM REV CODE 250: Performed by: NURSE ANESTHETIST, CERTIFIED REGISTERED

## 2017-04-21 PROCEDURE — D9220A PRA ANESTHESIA: Mod: ANES,,, | Performed by: ANESTHESIOLOGY

## 2017-04-21 PROCEDURE — 71000016 HC POSTOP RECOV ADDL HR: Performed by: UROLOGY

## 2017-04-21 PROCEDURE — 25000003 PHARM REV CODE 250: Performed by: UROLOGY

## 2017-04-21 PROCEDURE — 27200651 HC AIRWAY, LMA: Performed by: NURSE ANESTHETIST, CERTIFIED REGISTERED

## 2017-04-21 PROCEDURE — 25000003 PHARM REV CODE 250

## 2017-04-21 PROCEDURE — 71000015 HC POSTOP RECOV 1ST HR: Performed by: UROLOGY

## 2017-04-21 PROCEDURE — 37000009 HC ANESTHESIA EA ADD 15 MINS: Performed by: UROLOGY

## 2017-04-21 PROCEDURE — C1771 REP DEV, URINARY, W/SLING: HCPCS | Performed by: UROLOGY

## 2017-04-21 PROCEDURE — 57288 REPAIR BLADDER DEFECT: CPT | Mod: ,,, | Performed by: UROLOGY

## 2017-04-21 PROCEDURE — D9220A PRA ANESTHESIA: Mod: CRNA,,, | Performed by: NURSE ANESTHETIST, CERTIFIED REGISTERED

## 2017-04-21 PROCEDURE — 63600175 PHARM REV CODE 636 W HCPCS: Performed by: UROLOGY

## 2017-04-21 PROCEDURE — 36000707: Performed by: UROLOGY

## 2017-04-21 PROCEDURE — 63600175 PHARM REV CODE 636 W HCPCS: Performed by: ANESTHESIOLOGY

## 2017-04-21 PROCEDURE — 36000706: Performed by: UROLOGY

## 2017-04-21 DEVICE — SLING MIDURETHRAL LYNX MESH: Type: IMPLANTABLE DEVICE | Site: VAGINA | Status: FUNCTIONAL

## 2017-04-21 RX ORDER — LORAZEPAM 2 MG/ML
0.25 INJECTION INTRAMUSCULAR ONCE AS NEEDED
Status: DISCONTINUED | OUTPATIENT
Start: 2017-04-21 | End: 2017-04-22 | Stop reason: HOSPADM

## 2017-04-21 RX ORDER — FENTANYL CITRATE 50 UG/ML
INJECTION, SOLUTION INTRAMUSCULAR; INTRAVENOUS
Status: DISCONTINUED | OUTPATIENT
Start: 2017-04-21 | End: 2017-04-21

## 2017-04-21 RX ORDER — FAMOTIDINE 20 MG/50ML
INJECTION, SOLUTION INTRAVENOUS
Status: COMPLETED
Start: 2017-04-21 | End: 2017-04-21

## 2017-04-21 RX ORDER — PROPOFOL 10 MG/ML
VIAL (ML) INTRAVENOUS
Status: DISCONTINUED | OUTPATIENT
Start: 2017-04-21 | End: 2017-04-21

## 2017-04-21 RX ORDER — METOCLOPRAMIDE HYDROCHLORIDE 5 MG/ML
INJECTION INTRAMUSCULAR; INTRAVENOUS
Status: DISCONTINUED | OUTPATIENT
Start: 2017-04-21 | End: 2017-04-21

## 2017-04-21 RX ORDER — DIPHENHYDRAMINE HYDROCHLORIDE 50 MG/ML
25 INJECTION INTRAMUSCULAR; INTRAVENOUS EVERY 6 HOURS PRN
Status: DISCONTINUED | OUTPATIENT
Start: 2017-04-21 | End: 2017-04-22 | Stop reason: HOSPADM

## 2017-04-21 RX ORDER — HYDROCODONE BITARTRATE AND ACETAMINOPHEN 5; 325 MG/1; MG/1
1 TABLET ORAL EVERY 4 HOURS PRN
Status: DISCONTINUED | OUTPATIENT
Start: 2017-04-21 | End: 2017-04-22 | Stop reason: HOSPADM

## 2017-04-21 RX ORDER — HYDROMORPHONE HYDROCHLORIDE 2 MG/ML
0.2 INJECTION, SOLUTION INTRAMUSCULAR; INTRAVENOUS; SUBCUTANEOUS EVERY 5 MIN PRN
Status: DISCONTINUED | OUTPATIENT
Start: 2017-04-21 | End: 2017-04-22 | Stop reason: HOSPADM

## 2017-04-21 RX ORDER — MIDAZOLAM HYDROCHLORIDE 1 MG/ML
INJECTION, SOLUTION INTRAMUSCULAR; INTRAVENOUS
Status: DISCONTINUED | OUTPATIENT
Start: 2017-04-21 | End: 2017-04-21

## 2017-04-21 RX ORDER — ONDANSETRON 2 MG/ML
INJECTION INTRAMUSCULAR; INTRAVENOUS
Status: DISCONTINUED | OUTPATIENT
Start: 2017-04-21 | End: 2017-04-21

## 2017-04-21 RX ORDER — SODIUM CHLORIDE, SODIUM LACTATE, POTASSIUM CHLORIDE, CALCIUM CHLORIDE 600; 310; 30; 20 MG/100ML; MG/100ML; MG/100ML; MG/100ML
INJECTION, SOLUTION INTRAVENOUS CONTINUOUS PRN
Status: DISCONTINUED | OUTPATIENT
Start: 2017-04-21 | End: 2017-04-21

## 2017-04-21 RX ORDER — ONDANSETRON 2 MG/ML
4 INJECTION INTRAMUSCULAR; INTRAVENOUS EVERY 12 HOURS PRN
Status: DISCONTINUED | OUTPATIENT
Start: 2017-04-21 | End: 2017-04-22 | Stop reason: HOSPADM

## 2017-04-21 RX ORDER — SODIUM CHLORIDE, SODIUM LACTATE, POTASSIUM CHLORIDE, CALCIUM CHLORIDE 600; 310; 30; 20 MG/100ML; MG/100ML; MG/100ML; MG/100ML
INJECTION, SOLUTION INTRAVENOUS CONTINUOUS
Status: DISCONTINUED | OUTPATIENT
Start: 2017-04-21 | End: 2017-04-22 | Stop reason: HOSPADM

## 2017-04-21 RX ORDER — BACITRACIN 50000 [IU]/1
INJECTION, POWDER, FOR SOLUTION INTRAMUSCULAR
Status: DISCONTINUED | OUTPATIENT
Start: 2017-04-21 | End: 2017-04-21 | Stop reason: HOSPADM

## 2017-04-21 RX ORDER — MEPERIDINE HYDROCHLORIDE 50 MG/ML
12.5 INJECTION INTRAMUSCULAR; INTRAVENOUS; SUBCUTANEOUS ONCE AS NEEDED
Status: DISCONTINUED | OUTPATIENT
Start: 2017-04-21 | End: 2017-04-22 | Stop reason: HOSPADM

## 2017-04-21 RX ORDER — CEFAZOLIN SODIUM 2 G/50ML
2 SOLUTION INTRAVENOUS
Status: COMPLETED | OUTPATIENT
Start: 2017-04-21 | End: 2017-04-21

## 2017-04-21 RX ORDER — ACETAMINOPHEN 325 MG/1
650 TABLET ORAL EVERY 4 HOURS PRN
Status: DISCONTINUED | OUTPATIENT
Start: 2017-04-21 | End: 2017-04-22 | Stop reason: HOSPADM

## 2017-04-21 RX ORDER — OXYCODONE AND ACETAMINOPHEN 5; 325 MG/1; MG/1
1 TABLET ORAL EVERY 4 HOURS PRN
Qty: 20 TABLET | Refills: 0 | Status: SHIPPED | OUTPATIENT
Start: 2017-04-21 | End: 2017-05-26 | Stop reason: SDUPTHER

## 2017-04-21 RX ORDER — DOCUSATE SODIUM 100 MG/1
100 CAPSULE, LIQUID FILLED ORAL 2 TIMES DAILY
Qty: 30 CAPSULE | Refills: 0 | Status: SHIPPED | OUTPATIENT
Start: 2017-04-21 | End: 2017-05-01 | Stop reason: SDUPTHER

## 2017-04-21 RX ORDER — LIDOCAINE HCL/PF 100 MG/5ML
SYRINGE (ML) INTRAVENOUS
Status: DISCONTINUED | OUTPATIENT
Start: 2017-04-21 | End: 2017-04-21

## 2017-04-21 RX ORDER — HYDROMORPHONE HYDROCHLORIDE 2 MG/ML
0.5 INJECTION, SOLUTION INTRAMUSCULAR; INTRAVENOUS; SUBCUTANEOUS EVERY 4 HOURS PRN
Status: DISCONTINUED | OUTPATIENT
Start: 2017-04-21 | End: 2017-04-22 | Stop reason: HOSPADM

## 2017-04-21 RX ORDER — GLYCOPYRROLATE 0.2 MG/ML
INJECTION INTRAMUSCULAR; INTRAVENOUS
Status: DISCONTINUED | OUTPATIENT
Start: 2017-04-21 | End: 2017-04-21

## 2017-04-21 RX ORDER — CEFAZOLIN SODIUM 2 G/50ML
SOLUTION INTRAVENOUS
Status: DISPENSED
Start: 2017-04-21 | End: 2017-04-21

## 2017-04-21 RX ADMIN — PROPOFOL 50 MG: 10 INJECTION, EMULSION INTRAVENOUS at 08:04

## 2017-04-21 RX ADMIN — PROPOFOL 50 MG: 10 INJECTION, EMULSION INTRAVENOUS at 07:04

## 2017-04-21 RX ADMIN — FENTANYL CITRATE 50 MCG: 50 INJECTION INTRAMUSCULAR; INTRAVENOUS at 07:04

## 2017-04-21 RX ADMIN — METOCLOPRAMIDE 10 MG: 5 INJECTION, SOLUTION INTRAMUSCULAR; INTRAVENOUS at 06:04

## 2017-04-21 RX ADMIN — BACITRACIN 50000 UNITS: 50000 INJECTION, POWDER, FOR SOLUTION INTRAMUSCULAR at 07:04

## 2017-04-21 RX ADMIN — HYDROCODONE BITARTRATE AND ACETAMINOPHEN 1 TABLET: 5; 325 TABLET ORAL at 11:04

## 2017-04-21 RX ADMIN — SODIUM CHLORIDE, SODIUM LACTATE, POTASSIUM CHLORIDE, AND CALCIUM CHLORIDE: .6; .31; .03; .02 INJECTION, SOLUTION INTRAVENOUS at 06:04

## 2017-04-21 RX ADMIN — SODIUM CHLORIDE, SODIUM LACTATE, POTASSIUM CHLORIDE, AND CALCIUM CHLORIDE: .6; .31; .03; .02 INJECTION, SOLUTION INTRAVENOUS at 11:04

## 2017-04-21 RX ADMIN — ONDANSETRON 4 MG: 2 INJECTION, SOLUTION INTRAMUSCULAR; INTRAVENOUS at 07:04

## 2017-04-21 RX ADMIN — PROPOFOL 200 MG: 10 INJECTION, EMULSION INTRAVENOUS at 07:04

## 2017-04-21 RX ADMIN — GLYCOPYRROLATE 0.2 MG: 0.2 INJECTION, SOLUTION INTRAMUSCULAR; INTRAVENOUS at 06:04

## 2017-04-21 RX ADMIN — SODIUM CHLORIDE, SODIUM LACTATE, POTASSIUM CHLORIDE, AND CALCIUM CHLORIDE: .6; .31; .03; .02 INJECTION, SOLUTION INTRAVENOUS at 08:04

## 2017-04-21 RX ADMIN — MIDAZOLAM HYDROCHLORIDE 2 MG: 1 INJECTION, SOLUTION INTRAMUSCULAR; INTRAVENOUS at 06:04

## 2017-04-21 RX ADMIN — HYDROMORPHONE HYDROCHLORIDE 0.2 MG: 2 INJECTION INTRAMUSCULAR; INTRAVENOUS; SUBCUTANEOUS at 09:04

## 2017-04-21 RX ADMIN — CEFAZOLIN SODIUM 2 G: 2 SOLUTION INTRAVENOUS at 07:04

## 2017-04-21 RX ADMIN — LIDOCAINE HYDROCHLORIDE 100 MG: 20 INJECTION, SOLUTION INTRAVENOUS at 07:04

## 2017-04-21 RX ADMIN — FAMOTIDINE 20 MG: 20 INJECTION, SOLUTION INTRAVENOUS at 06:04

## 2017-04-21 NOTE — DISCHARGE INSTRUCTIONS
BATHING/DRESSING:  ? Ok to shower tomorrow    ACTIVITY LEVEL: If you have received sedation or an anesthetic, you may feel sleepy for several hours. Rest until you are more awake. Gradually resume your normal activities.    No heavy lifting.      DIET: You may resume your home diet. If nausea is present, increase your diet gradually with fluids and bland foods.  Medications: Pain medication should be taken only if needed and as directed.You will be given an updated list of you medications.  ? No driving, alcoholic beverages or signing legal documents for next 24 hours or while taking pain medication    CALL THE DOCTOR:    Some blood in your urine is normal.     Redness, swelling, foul smell  or fever over 101.   Shortness of breath, Coughing Up Bloody Sputum, or Pains or Swelling in your Calves..   Persistent pain or nausea not relieved by medication.   Problems urinating - unable to urinate or heavy bleeding (with our without clots) in urine.    If any unusual problems or difficulties occur contact your doctor. If you cannot contact your doctor but feel your signs and symptoms warrant a physicians attention return to the emergency room.  Fall Prevention      Millions of people fall every year and injure themselves. You may have had anesthesia or sedation which may increase your risk of falling. You may have health issues that put you at an increased risk of falling.     Here are ways to reduce your risk of falling.  ·   · Make your home safe by keeping walkways clear of objects you may trip over.  · Use non-slip pads under rugs. Do not use area rugs or small throw rugs.  · Use non-slip mats in bathtubs and showers.  · Install handrails and lights on staircases.  · Do not walk in poorly lit areas.  · Do not stand on chairs or wobbly ladders.  · Use caution when reaching overhead or looking upward. This position can cause a loss of balance.  · Be sure your shoes fit properly, have non-slip bottoms and are in  good condition.   · Wear shoes both inside and out. Avoid going barefoot or wearing slippers.  · Be cautious when going up and down stairs, curbs, and when walking on uneven sidewalks.  · If your balance is poor, consider using a cane or walker.  · If your fall was related to alcohol use, stop or limit alcohol intake.   · If your fall was related to use of sleeping medicines, talk to your doctor about this. You may need to reduce your dosage at bedtime if you awaken during the night to go to the bathroom.    · To reduce the need for nighttime bathroom trips:  ¨ Avoid drinking fluids for several hours before going to bed  ¨ Empty your bladder before going to bed  ¨ Men can keep a urinal at the bedside  · Stay as active as you can. Balance, flexibility, strength, and endurance all come from exercise. They all play a role in preventing falls. Ask your healthcare provider which types of activity are right for you.  · Get your vision checked on a regular basis.  · If you have pets, know where they are before you stand up or walk so you don't trip over them.  Use night lights.No heavy lifting over 10bs for 6 weeks. Minimize bending/squatting. No rigorous/strenuous exercise. Okay to walk/use stairs. Do not strain for bowel movement.     Nothing per vagina for 6 weeks (ie tampons/intercourse). Use pads for any vaginal bleeding.    You got a Vicodin at 11:40 am.

## 2017-04-21 NOTE — IP AVS SNAPSHOT
[place] [address] [place] [address]  Phone: [phone]           Patient Discharge Instructions   Our goal is to set you up for success. This packet includes information on your condition, medications, and your home care.  It will help you care for yourself to prevent having to return to the hospital.     Please ask your nurse if you have any questions.      There are many details to remember when preparing to leave the hospital. Here is what you will need to do:    1. Take your medicine. If you are prescribed medications, review your Medication List on the following pages. You may have new medications to  at the pharmacy and others that you'll need to stop taking. Review the instructions for how and when to take your medications. Talk with your doctor or nurses if you are unsure of what to do.     2. Go to your follow-up appointments. Specific follow-up information is listed in the following pages. Your may be contacted by a nurse or clinical provider about future appointments. Be sure we have all of the phone numbers to reach you. Please contact your provider's office if you are unable to make an appointment.     3. Watch for warning signs. Your doctor or nurse will give you detailed warning signs to watch for and when to call for assistance. These instructions may also include educational information about your condition. If you experience any of warning signs to your health, call your doctor.           [facility]  Unless otherwise directed by your provider, please   contact [facility], our nurse care line   that is available for 24/7 assistance.     [phone] (toll-free)     Registered nurses in the [facility]   provide: appointment scheduling, clinical advisement, health education, and other advisory services.                  ** Verify the list of medication(s) below is accurate and up to date. Carry this with you in case of emergency.  If your medications have changed, please notify your healthcare provider.             Medication List      START taking these medications        Additional Info                      docusate sodium 100 MG capsule   Commonly known as:  COLACE   Quantity:  30 capsule   Refills:  0   Dose:  100 mg    Instructions:  Take 1 capsule (100 mg total) by mouth 2 (two) times daily.     Begin Date    AM    Noon    PM    Bedtime       oxycodone-acetaminophen 5-325 mg per tablet   Commonly known as:  PERCOCET   Quantity:  20 tablet   Refills:  0   Dose:  1 tablet    Instructions:  Take 1 tablet by mouth every 4 (four) hours as needed for Pain.     Begin Date    AM    Noon    PM    Bedtime         CONTINUE taking these medications        Additional Info                      biotin 2,500 mcg Tab   Refills:  0   Dose:  5000 mcg    Instructions:  Take 5,000 mcg by mouth once daily.     Begin Date    AM    Noon    PM    Bedtime       desvenlafaxine succinate 100 MG Tb24   Commonly known as:  PRISTIQ   Quantity:  30 tablet   Refills:  5   Dose:  100 mg    Instructions:  Take 100 mg by mouth once daily.     Begin Date    AM    Noon    PM    Bedtime       M-VIT ORAL   Refills:  0   Dose:  1 tablet    Instructions:  Take 1 tablet by mouth once daily.     Begin Date    AM    Noon    PM    Bedtime       PROAIR HFA 90 mcg/actuation inhaler   Refills:  0   Dose:  2 puff   Generic drug:  albuterol    Instructions:  Inhale 2 puffs into the lungs every 6 (six) hours as needed for Wheezing. Rescue     Begin Date    AM    Noon    PM    Bedtime       VITAMIN C 1000 MG tablet   Refills:  0   Dose:  1000 mg   Generic drug:  ascorbic acid (vitamin C)    Instructions:  Take 1,000 mg by mouth once daily.     Begin Date    AM    Noon    PM    Bedtime       zolpidem 10 mg Tab   Commonly known as:  AMBIEN   Quantity:  30 tablet   Refills:  0   Dose:  10 mg    Instructions:  Take 1 tablet (10 mg total) by mouth nightly as needed.     Begin Date    AM     Noon    PM    Bedtime            Where to Get Your Medications      These medications were sent to [id number] - [place], [place] - [address] [facility] [address]  [address], [facility] [address]    Hours:  24-hours Phone:  [phone]     docusate sodium 100 MG capsule    oxycodone-acetaminophen 5-325 mg per tablet                  Please bring to all follow up appointments:    1. A copy of your discharge instructions.  2. All medicines you are currently taking in their original bottles.  3. Identification and insurance card.    Please arrive 15 minutes ahead of scheduled appointment time.    Please call 24 hours in advance if you must reschedule your appointment and/or time.        Your Scheduled Appointments     May 01, 2017 10:20 AM CDT   Post OP with Ruma Kowalski MD   [facility]    [address] [facility] [address]  [facility] [id number] [phone]              Follow-up Information     Follow up with Ruma Kowalski MD In 2 weeks.    Specialty:  Urology    Why:  For post-op follow up    Contact information:    [address] [place] [address]  [address]  [facility] [address]  [phone]          Discharge Instructions     Future Orders    Call MD for:  difficulty breathing, headache or visual disturbances     Call MD for:  persistent nausea and vomiting     Call MD for:  redness, tenderness, or signs of infection (pain, swelling, redness, odor or green/yellow discharge around incision site)     Call MD for:  severe uncontrolled pain     Call MD for:  temperature >100.4     Diet general     Questions:    Total calories:      Fat restriction, if any:      Protein restriction, if any:      Na restriction, if any:      Fluid restriction:      Additional restrictions:      Lifting restrictions     Comments:    No heavy lifting over 10bs for 6 weeks. Minimize bending/squatting. No rigorous/strenuous exercise. Okay to walk/use stairs. Do not strain for  bowel movement.   Nothing per vagina for 6 weeks (ie tampons/intercourse). Use pads for any vaginal bleeding.    No dressing needed         Discharge Instructions         BATHING/DRESSING:  ? Ok to shower tomorrow    ACTIVITY LEVEL: If you have received sedation or an anesthetic, you may feel sleepy for several hours. Rest until you are more awake. Gradually resume your normal activities.    No heavy lifting.      DIET: You may resume your home diet. If nausea is present, increase your diet gradually with fluids and bland foods.  Medications: Pain medication should be taken only if needed and as directed.You will be given an updated list of you medications.  ? No driving, alcoholic beverages or signing legal documents for next 24 hours or while taking pain medication    CALL THE DOCTOR:    Some blood in your urine is normal.     Redness, swelling, foul smell  or fever over 101.   Shortness of breath, Coughing Up Bloody Sputum, or Pains or Swelling in your Calves..   Persistent pain or nausea not relieved by medication.   Problems urinating - unable to urinate or heavy bleeding (with our without clots) in urine.    If any unusual problems or difficulties occur contact your doctor. If you cannot contact your doctor but feel your signs and symptoms warrant a physicians attention return to the emergency room.  Fall Prevention      Millions of people fall every year and injure themselves. You may have had anesthesia or sedation which may increase your risk of falling. You may have health issues that put you at an increased risk of falling.     Here are ways to reduce your risk of falling.  ·   · Make your home safe by keeping walkways clear of objects you may trip over.  · Use non-slip pads under rugs. Do not use area rugs or small throw rugs.  · Use non-slip mats in bathtubs and showers.  · Install handrails and lights on staircases.  · Do not walk in poorly lit areas.  · Do not stand on chairs or wobbly  ladders.  · Use caution when reaching overhead or looking upward. This position can cause a loss of balance.  · Be sure your shoes fit properly, have non-slip bottoms and are in good condition.   · Wear shoes both inside and out. Avoid going barefoot or wearing slippers.  · Be cautious when going up and down stairs, curbs, and when walking on uneven sidewalks.  · If your balance is poor, consider using a cane or walker.  · If your fall was related to alcohol use, stop or limit alcohol intake.   · If your fall was related to use of sleeping medicines, talk to your doctor about this. You may need to reduce your dosage at bedtime if you awaken during the night to go to the bathroom.    · To reduce the need for nighttime bathroom trips:  ¨ Avoid drinking fluids for several hours before going to bed  ¨ Empty your bladder before going to bed  ¨ Men can keep a urinal at the bedside  · Stay as active as you can. Balance, flexibility, strength, and endurance all come from exercise. They all play a role in preventing falls. Ask your healthcare provider which types of activity are right for you.  · Get your vision checked on a regular basis.  · If you have pets, know where they are before you stand up or walk so you don't trip over them.  Use night lights.No heavy lifting over 10bs for 6 weeks. Minimize bending/squatting. No rigorous/strenuous exercise. Okay to walk/use stairs. Do not strain for bowel movement.     Nothing per vagina for 6 weeks (ie tampons/intercourse). Use pads for any vaginal bleeding.    You got a Vicodin at 11:40 am.      Discharge References/Attachments     MIDURETHRAL SLING SURGERY, HAVING: BOY (ENGLISH)    DOCUSATE CAPSULES (ENGLISH)    ACETAMINOPHEN; OXYCODONE TABLETS (ENGLISH)        Primary Diagnosis     Your primary diagnosis was:  Loss Of Bladder Control      Admission Information     Date & Time Provider Department CSN    4/21/2017  5:43 AM Ruma Kowalski MD [facility]  "[id number]      Care Providers     Provider Role Specialty Primary office phone    Ruma Kowalski MD Attending Provider Urology [phone]    Ruma Kowalski MD Surgeon  Urology [phone]      Your Vitals Were     BP Pulse Temp Resp Height Weight    122/76 72 97.4 °F (36.3 °C) (Axillary) 16 5' 7" (1.702 m) 88 kg (194 lb)    Last Period SpO2 BMI          04/16/2017 100% 30.38 kg/m2        Recent Lab Values        6/15/2015                           9:01 AM           A1C 5.3                       Allergies as of 4/21/2017        Reactions    No Known Allergies       Advance Directives     An advance directive is a document which, in the event you are no longer able to make decisions for yourself, tells your healthcare team what kind of treatment you do or do not want to receive, or who you would like to make those decisions for you.  If you do not currently have an advance directive, [facility] encourages you to create one.  For more information call:  [phone]), [phone] ([phone]),  or log on to [email or web address].        Language Assistance Services     ATTENTION: Language assistance services are available, free of charge. Please call [phone].      ATENCIÓN: Si habla español, tiene a beltran disposición servicios gratuitos de asistencia lingüística. Llame al [phone].     CHÚ Ý: N?u b?n nói Ti?ng Vi?t, có các d?ch v? h? tr? ngôn ng? mi?n phí dành cho b?n. G?i s? [phone].         [facility] complies with applicable Federal civil rights laws and does not discriminate on the basis of race, color, national origin, age, disability, or sex.        "

## 2017-04-21 NOTE — OP NOTE
DATE OF PROCEDURE: 4/21/17     SURGEON: Ruma Kowalski M.D.     PREOPERATIVE DIAGNOSES: Stress urinary incontinence     POSTOPERATIVE DIAGNOSES: Stress urinary incontinence      PROCEDURE PERFORMED: Placement of retropubic mid urethral sling ([employer]), cystoscopy.     INDICATIONS FOR PROCEDURE:   Rad Fraser is a 41 y.o. female who is an established patient who was referred by Dr Osorio for evaluation of BOY.      Urinary Incontinence  Patient complains of urinary incontinence. This has been present for several years but worsening for several months. She leaks urine with coughing, laughing, sneezing, exercise, with urge. Patient describes the symptoms as nocturia 3 times per night, sensation of incomplete emptying of bladder and urine leakage with coughing/heavy physical activity. Factors associated with symptoms include none known. Evaluation to date includes none. Treatment to date includes Kegel exercises, which was not very effective. UUI less of an issue.      Cysto/UDS 2/7/17:  Findings of the Procedure: Stable filling. No IBC/DO. ++BOY noted with cough. Large capacity bladder with some hesitancy likely due to testing situation. Good bladder contraction with normal flow. Complete emptying.  Recommendations: Stress urinary incontinence - recommend MUS (other options PFPT, pessary, bulking agent).     DESCRIPTION OF PROCEDURE: Ms. Fraser was brought to the Operating Room and placed under general endotracheal anesthesia. Full timeout procedures were performed identifying the correct patient and procedure. Appropriate IV antibiotic with Ancef was given prior to commencement of surgery. The patient was placed in dorsal lithotomy position and the surgical area was clipped of hair. The area was then prepped and draped in the usual sterile fashion.     A 16-Prydeinig Zaldivar catheter was placed per urethra into the bladder with 10 mL of sterile water into the balloon. The bladder was drained in its  entirety. Silk sutures were used to secure the drapes to exclude the anus from the field. Retraction sutures were placed bilaterally in the hymenal ring to the drapes. A weighted speculum was placed into the vagina.     A 10 mL of injectable saline were then injected at the midline of mid-urethra with aid of Allis on the superior and distal aspect of the mid urethral area. Injection was  done to hydrodissect to create a plane in the periurethral space. The   15-blade scalpel was then used to make an approximately 2-cm incision in the mid urethral space, approximately 1cm away from urethral meatus. Three Allis clamps were used first on the patient's right side for retraction. Metzenbaum scissors were used to develop periurethral space with a combination of sharp and blunt dissection. This was carried back to the endopelvic fascia to allow placement of my index finger into the periurethral space. Once adequate spacing was accomplished, attention was turned to the contralateral side. Again, 3 Allis clamps were used. Vaginal flap and Metzenbaum scissors were used to create a full thickness vaginal flap. No areas of injury to the urethra were noted throughout this dissection. Once adequate spacing was developed bilaterally allowing the index finger into the periurethral space, the dissection was stopped.     Next, the pubic bone was palpated and marked in the midline area. A 2-cm marks were made on the right and left side of midline and a stab incision was made with the 15-blade scalpel. The retropubic sling trocars were then used first in a perpendicular fashion to the pubic bone until the pubic bone was met with the trocar and then the trocar was angled to allow curling of the trocar around the pubic bone. Again, the bladder was ensured to be drained prior to attempting of this maneuver. Index finger was placed in the periurethral space to allow guidance of the trocar needle into the appropriate position. The needle  was palpated and tracked through the endopelvic fascia and out through the periurethral tunnel that was created previously. This similar procedure was performed on the patient's contralateral side, again was index finger to guide the needle trocar into the appropriate periurethral space after functioning the endopelvic fascia. Once needles were in place, the Zaldivar catheter was removed.     A flexible cystoscope was then placed per urethra into the bladder. Bladder was completely filled and careful cystoscopy was performed. There were no areas of obvious bladder injury. The trocars were unable to be visualized. The trocars were slightly moved in their current position to look for any tenting of the bladder, which again was negative. The urethra was carefully inspected upon removal of the cystoscope and no urethral injuries were noted.  A 16-Jordanian Zaldivar catheter was replaced back into the bladder and 10 mL was placed into the balloon.     Next, we proceeded with mesh placement. The mesh arms were secured to the distal end of the trocars ensuring that the sling was flat and in the appropriate positioning. The trocars were retracted and bringing the mesh up into the retropubic space, the mesh arms were brought out through the retrograde space through the previously placed incision. A Caridad clamp were used for spacing of the sling and the urethra.     Once the mesh was in appropriate position, the blue plastic midline marker was cut and then the plastic sheaths were removed. The sheaths were removed in their entirety while keeping the Caridad clamp spacer in place to prevent over tensioning of the sling. Once the plastic arms were removed, the Toth scissors were removed from the spacing and the sling appeared flat and not over tensioned. The sling was deemed to be in the appropriate position and therefore, the redundant mesh was cut at the skin level and buried in the subcutaneous tissue.     The vaginal incision was then  closed with a running 2-0 Vicryl in a locking position. Next, the retropubic incisions of the abdomen were closed with Dermabond.      Vaginal packing with Premarin cream was then placed into the vagina. The weighted speculum was removed. The retraction sutures were removed.     The patient was then awakened from general anesthesia and transferred to the PACU in stable condition.     COMPLICATIONS: None.     FINDINGS: Normal cystoscopy with no evidence of bladder or urethral injury.     SPECIMENS: None.     DRAINS: A 16-English Zaldivar catheter.     ESTIMATED BLOOD LOSS: 20 mL.     PATIENT DISPOSITION: The patient is stable and will be deemed appropriate for discharge home following voiding trial in the recovery area. She was instructed on postoperative precautions including vaginal rest and no heavy lifting for 6 weeks. She will return in 2 weeks for postoperative check including PVR.

## 2017-04-21 NOTE — ANESTHESIA POSTPROCEDURE EVALUATION
"Anesthesia Post Evaluation    Patient: Rad Fraser    Procedure(s) Performed: Procedure(s) (LRB):  SLING-MID URETHRAL W/ LYNX RETROPUBIC SLING KIT  (N/A)    Final Anesthesia Type: general  Patient location during evaluation: PACU  Patient participation: Yes- Able to Participate  Level of consciousness: awake  Post-procedure vital signs: reviewed and stable  Pain management: adequate  Airway patency: patent  PONV status at discharge: No PONV  Anesthetic complications: no      Cardiovascular status: stable  Respiratory status: unassisted  Hydration status: euvolemic  Follow-up not needed.        Visit Vitals    /73 (BP Location: Left arm, BP Method: Automatic)    Pulse 82    Temp 36.5 °C (97.7 °F) (Axillary)    Resp 17    Ht 5' 7" (1.702 m)    Wt 88 kg (194 lb)    LMP 04/16/2017    SpO2 98%    Breastfeeding No    BMI 30.38 kg/m2       Pain/Ashly Score: Pain Assessment Performed: Yes (4/21/2017  8:54 AM)  Presence of Pain: non-verbal indicators absent (4/21/2017  8:54 AM)  Pain Rating Prior to Med Admin: 5 (4/21/2017  9:32 AM)  Ashly Score: 8 (4/21/2017  9:01 AM)      "

## 2017-04-21 NOTE — ANESTHESIA PREPROCEDURE EVALUATION
04/21/2017  Rad Fraser is a 41 y.o., female.    Anesthesia Evaluation    I have reviewed the Patient Summary Reports.     I have reviewed the Medications.     Review of Systems  Anesthesia Hx:  History of prior surgery of interest to airway management or planning:   Social:  Non-Smoker    Hematology/Oncology:  Hematology Normal        EENT/Dental:   chronic allergic rhinitis   Cardiovascular:  Cardiovascular Normal     Pulmonary:   Asthma    Renal/:  Renal/ Normal     Hepatic/GI:  Hepatic/GI Normal    Psych:   Psychiatric History anxiety depression          Physical Exam  General:  Well nourished    Airway/Jaw/Neck:  Airway Findings: Mouth Opening: Normal Tongue: Normal  General Airway Assessment: Adult  Mallampati: II  TM Distance: 4 - 6 cm  Jaw/Neck Findings:  Neck ROM: Normal ROM      Dental:  Dental Findings: In tact   Chest/Lungs:  Chest/Lungs Findings: Normal Respiratory Rate     Heart/Vascular:  Heart Findings: Rate: Normal        Mental Status:  Mental Status Findings:  Cooperative         Anesthesia Plan  Type of Anesthesia, risks & benefits discussed:  Anesthesia Type:  general  Patient's Preference:   Intra-op Monitoring Plan: standard ASA monitors  Intra-op Monitoring Plan Comments:   Post Op Pain Control Plan:   Post Op Pain Control Plan Comments:   Induction:   IV  Beta Blocker:  Patient is not currently on a Beta-Blocker (No further documentation required).       Informed Consent: Patient understands risks and agrees with Anesthesia plan.  Questions answered. Anesthesia consent signed with patient.  ASA Score: 2     Day of Surgery Review of History & Physical:    H&P update referred to the provider.         Ready For Surgery From Anesthesia Perspective.

## 2017-04-21 NOTE — TRANSFER OF CARE
"Anesthesia Transfer of Care Note    Patient: Rad Fraser    Procedure(s) Performed: Procedure(s) (LRB):  SLING-MID URETHRAL W/ LYNX RETROPUBIC SLING KIT  (N/A)    Patient location: PACU    Anesthesia Type: general    Transport from OR: Transported from OR on room air with adequate spontaneous ventilation    Post pain: adequate analgesia    Post assessment: tolerated procedure well and no apparent anesthetic complications    Post vital signs: stable    Level of consciousness: sedated    Nausea/Vomiting: no nausea/vomiting    Complications: none          Last vitals:   Visit Vitals    /73 (BP Location: Left arm, BP Method: Automatic)    Pulse 82    Temp 36.5 °C (97.7 °F) (Axillary)    Resp 17    Ht 5' 7" (1.702 m)    Wt 88 kg (194 lb)    LMP 04/16/2017    SpO2 98%    Breastfeeding No    BMI 30.38 kg/m2     "

## 2017-04-21 NOTE — BRIEF OP NOTE
[facility]  Brief Operative Note     SUMMARY     Surgery Date: 4/21/2017     Surgeon(s) and Role:     * Ruma Kowlaski MD - Primary    Assisting Surgeon: None    Pre-op Diagnosis:  Mixed incontinence urge and stress [N39.46]    Post-op Diagnosis:  Post-Op Diagnosis Codes:     * Mixed incontinence urge and stress [N39.46]    Procedure(s) (LRB):  SLING-MID URETHRAL W/ LYNX RETROPUBIC SLING KIT  (N/A)    Anesthesia: General    Description of the findings of the procedure: Tension free MUS placement.    Findings/Key Components: Normal cystoscopy - no bladder/urethral injury.    Estimated Blood Loss: 20cc         Specimens:   Specimen     None          Discharge Note    SUMMARY     Admit Date: 4/21/2017    Discharge Date and Time:  04/21/2017 8:53 AM    Hospital Course (synopsis of major diagnoses, care, treatment, and services provided during the course of the hospital stay): Uncomplicated RP-MUS     Final Diagnosis: Post-Op Diagnosis Codes:     * Mixed incontinence urge and stress [N39.46]    Disposition: Home or Self Care    Follow Up/Patient Instructions:     Medications:  Reconciled Home Medications:   Current Discharge Medication List      START taking these medications    Details   docusate sodium (COLACE) 100 MG capsule Take 1 capsule (100 mg total) by mouth 2 (two) times daily.  Qty: 30 capsule, Refills: 0      oxycodone-acetaminophen (PERCOCET) 5-325 mg per tablet Take 1 tablet by mouth every 4 (four) hours as needed for Pain.  Qty: 20 tablet, Refills: 0         CONTINUE these medications which have NOT CHANGED    Details   albuterol (PROAIR HFA) 90 mcg/actuation inhaler Inhale 2 puffs into the lungs every 6 (six) hours as needed for Wheezing. Rescue      desvenlafaxine succinate (PRISTIQ) 100 MG Tb24 Take 100 mg by mouth once daily.  Qty: 30 tablet, Refills: 5    Associated Diagnoses: Mixed anxiety and depressive disorder      ascorbic acid, vitamin C, (VITAMIN C) 1000 MG tablet Take  1,000 mg by mouth once daily.      biotin 2,500 mcg Tab Take 5,000 mcg by mouth once daily.      PV W-O RAAD/FERROUS FUMARATE/FA (M-VIT ORAL) Take 1 tablet by mouth once daily.      zolpidem (AMBIEN) 10 mg Tab Take 1 tablet (10 mg total) by mouth nightly as needed.  Qty: 30 tablet, Refills: 0    Associated Diagnoses: Insomnia, unspecified type             Discharge Procedure Orders  Diet general     Call MD for:  temperature >100.4     Call MD for:  persistent nausea and vomiting     Call MD for:  severe uncontrolled pain     Call MD for:  difficulty breathing, headache or visual disturbances     No dressing needed     Lifting restrictions   Order Comments: No heavy lifting over 10bs for 6 weeks. Minimize bending/squatting. No rigorous/strenuous exercise. Okay to walk/use stairs. Do not strain for bowel movement.   Nothing per vagina for 6 weeks (ie tampons/intercourse). Use pads for any vaginal bleeding.     Call MD for:  redness, tenderness, or signs of infection (pain, swelling, redness, odor or green/yellow discharge around incision site)       Follow-up Information     Follow up with Ruma Kowalski MD In 2 weeks.    Specialty:  Urology    Why:  For post-op follow up    Contact information:    [address] [place], [place] [address] [phone]

## 2017-05-01 ENCOUNTER — OFFICE VISIT (OUTPATIENT)
Dept: UROLOGY | Facility: CLINIC | Age: 42
End: 2017-05-01
Payer: COMMERCIAL

## 2017-05-01 ENCOUNTER — TELEPHONE (OUTPATIENT)
Dept: UROLOGY | Facility: CLINIC | Age: 42
End: 2017-05-01

## 2017-05-01 ENCOUNTER — HOSPITAL ENCOUNTER (OUTPATIENT)
Dept: RADIOLOGY | Facility: HOSPITAL | Age: 42
Discharge: HOME | End: 2017-05-01
Attending: UROLOGY
Payer: COMMERCIAL

## 2017-05-01 VITALS
HEART RATE: 60 BPM | SYSTOLIC BLOOD PRESSURE: 112 MMHG | WEIGHT: 191.81 LBS | HEIGHT: 61 IN | BODY MASS INDEX: 36.21 KG/M2 | RESPIRATION RATE: 16 BRPM | DIASTOLIC BLOOD PRESSURE: 70 MMHG

## 2017-05-01 DIAGNOSIS — R19.8 SUPRAPUBIC FULLNESS: ICD-10-CM

## 2017-05-01 DIAGNOSIS — R19.00 PELVIC FULLNESS IN FEMALE: Primary | ICD-10-CM

## 2017-05-01 DIAGNOSIS — R19.8 SUPRAPUBIC FULLNESS: Primary | ICD-10-CM

## 2017-05-01 PROCEDURE — 99024 POSTOP FOLLOW-UP VISIT: CPT | Mod: S$GLB,,, | Performed by: UROLOGY

## 2017-05-01 PROCEDURE — 74178 CT ABD&PLV WO CNTR FLWD CNTR: CPT | Mod: 26,,, | Performed by: RADIOLOGY

## 2017-05-01 PROCEDURE — 25500020 PHARM REV CODE 255: Performed by: UROLOGY

## 2017-05-01 PROCEDURE — 51700 IRRIGATION OF BLADDER: CPT | Mod: 58,S$GLB,, | Performed by: UROLOGY

## 2017-05-01 PROCEDURE — 99999 PR PBB SHADOW E&M-EST. PATIENT-LVL IV: CPT | Mod: 25,PBBFAC,, | Performed by: UROLOGY

## 2017-05-01 PROCEDURE — 74178 CT ABD&PLV WO CNTR FLWD CNTR: CPT | Mod: TC

## 2017-05-01 RX ORDER — DOCUSATE SODIUM 100 MG/1
100 CAPSULE, LIQUID FILLED ORAL 2 TIMES DAILY
Qty: 30 CAPSULE | Refills: 0 | Status: SHIPPED | OUTPATIENT
Start: 2017-05-01 | End: 2017-05-22 | Stop reason: SDUPTHER

## 2017-05-01 RX ORDER — CIPROFLOXACIN 2 MG/ML
400 INJECTION, SOLUTION INTRAVENOUS
Status: CANCELLED | OUTPATIENT
Start: 2017-05-01

## 2017-05-01 RX ADMIN — IOHEXOL 125 ML: 350 INJECTION, SOLUTION INTRAVENOUS at 02:05

## 2017-05-01 NOTE — PROGRESS NOTES
Subjective:       Rad Fraser is a 41 y.o. female who is an established patient who was referred by Dr Osorio for evaluation of BOY.      Urinary Incontinence  Patient complains of urinary incontinence. This has been present for several years but worsening for several months. She leaks urine with coughing, laughing, sneezing, exercise, with urge. Patient describes the symptoms as nocturia 3 times per night, sensation of incomplete emptying of bladder and urine leakage with coughing/heavy physical activity. Factors associated with symptoms include none known. Evaluation to date includes none. Treatment to date includes Kegel exercises, which was not very effective. UUI less of an issue.     Cysto/UDS 2/7/17:  Findings of the Procedure: Stable filling. No IBC/DO. ++BOY noted with cough. Large capacity bladder with some hesitancy likely due to testing situation. Good bladder contraction with normal flow. Complete emptying.  Recommendations: Stress urinary incontinence - recommend MUS (other options PFPT, pessary, bulking agent).    She is now s/p RP-MUS on 4/21/17. She reports no issues with leakage but is having slow stream.     PVR (bladder scan) today - >691cc, void then >912cc.       The following portions of the patient's history were reviewed and updated as appropriate: allergies, current medications, past family history, past medical history, past social history, past surgical history and problem list.    Review of Systems  Constitutional: no fever or chills  ENT: no nasal congestion or sore throat  Respiratory: no cough or shortness of breath  Cardiovascular: no chest pain or palpitations  Gastrointestinal: no nausea or vomiting, tolerating diet  Genitourinary: as per HPI  Hematologic/Lymphatic: no easy bruising or lymphadenopathy  Musculoskeletal: no arthralgias or myalgias  Skin: no rashes or lesions  Neurological: no seizures or tremors  Behavioral/Psych: no auditory or visual hallucinations      "  Objective:    Vitals:   Pulse 60  Resp 16  Ht 5' 1" (1.549 m)  Wt 87 kg (191 lb 12.8 oz)  LMP 04/16/2017  BMI 36.24 kg/m2    Physical Exam   General: well developed, well nourished in no acute distress  Head: normocephalic, atraumatic  Neck: supple, trachea midline, no obvious enlargement of thyroid  HEENT: EOMI, mucus membranes moist, sclera anicteric, no hearing impairment  Lungs: symmetric expansion, non-labored breathing  Cardiovascular: regular rate and rhythm, normal pulses  Abdomen: soft, non tender, non distended, no palpable masses, no hepatosplenomegaly, no hernias, no CVA tenderness  Musculoskeletal: no peripheral edema, normal ROM in bilateral upper and lower extremities  Lymphatics: no cervical or inguinal lymphadenopathy  Skin: no rashes or lesions  Neuro: alert and oriented x 3, no gross deficits  Psych: normal judgment and insight, normal mood/affect and non-anxious  Genitourinary:   Breast - deferred. External genitalia - no masses or lesions, normal hair distribution.  Urethral meatus - orthotopic location without lesion or prolapse.  Urethra - no lesions palpated, non tender, no discharge, no diverticula noted.  Bladder - palpable distention in SP area. Sutures in incision. Minimal UOP with passage of catheter but SP area remains distended.    16Fr winston passed into bladder with sterile technique. Bladder irrigated with NS. Clear yellow urine. Minimal UOP.       Lab Review   Urine analysis today in clinic shows negative for all components     Lab Results   Component Value Date    WBC 4.74 04/18/2017    HGB 13.6 04/18/2017    HCT 40.7 04/18/2017    MCV 91 04/18/2017     04/18/2017     Lab Results   Component Value Date    CREATININE 0.8 06/15/2015    BUN 11 06/15/2015         Imaging  Images and reports were personally reviewed by me and discussed with patient    Cystoscopy:  No bladder or urethral injury noted. Clear efflux from L ureter. No efflux noted from R.    CT uro:  Large " pelvic fluid collection.       Assessment/Plan:      1. Mixed incontinence urge and stress    - Discussed difference of UUI and BOY components. Reviewed etiology and workup of each.   - BOY: Kegels, PFPT, bulking agent, MUS.   - UUI: Behavioral changes, PFPT, anticholinergics. Botox/InterStim for refractory UUI.   - BOY much more bothersome. UUI rare.   - Cysto/UDS 1/31/17 - BOY confirmed   - s/p RP-MUS 4/21/17   - Suspected UR 2/2 high bladder scan PVR - attempted CIC teaching but minimal UOP   - Cystoscopy negative for bladder injury   - CT uro STAT - large pelvic fluid collection, unable to r/o R ureteral injury, ?ovarian mass   - Recommend cysto/RPG tomorrow in OR          Follow up in 1 week

## 2017-05-01 NOTE — TELEPHONE ENCOUNTER
"----- Message from Cheyenne Ortiz sent at 5/1/2017  2:14 PM CDT -----  Contact: Patient herself  X  1st Request  _  2nd Request  _  3rd Request    Who: Rad Fraser (mrn# [id number])    Why: Patient called to make aware, "she's still down stairs waiting to have the CT scan but intends to come back up as soon as she's finishes."   THANKS!    What Number to Call Back:  [phone]    When to Expect a call back: (Before the end of the day)   -- if the call is after 12:00, the call back will be tomorrow.                        "

## 2017-05-02 ENCOUNTER — SURGERY (OUTPATIENT)
Age: 42
End: 2017-05-02

## 2017-05-02 ENCOUNTER — ANESTHESIA (OUTPATIENT)
Dept: SURGERY | Facility: HOSPITAL | Age: 42
End: 2017-05-02
Payer: COMMERCIAL

## 2017-05-02 ENCOUNTER — ANESTHESIA EVENT (OUTPATIENT)
Dept: SURGERY | Facility: HOSPITAL | Age: 42
End: 2017-05-02
Payer: COMMERCIAL

## 2017-05-02 ENCOUNTER — HOSPITAL ENCOUNTER (OUTPATIENT)
Facility: HOSPITAL | Age: 42
LOS: 1 days | Discharge: HOME | End: 2017-05-03
Attending: UROLOGY | Admitting: UROLOGY
Payer: COMMERCIAL

## 2017-05-02 ENCOUNTER — HOSPITAL ENCOUNTER (OUTPATIENT)
Dept: PREADMISSION TESTING | Facility: HOSPITAL | Age: 42
Discharge: HOME | End: 2017-05-02
Attending: UROLOGY | Admitting: UROLOGY
Payer: COMMERCIAL

## 2017-05-02 VITALS
TEMPERATURE: 97 F | SYSTOLIC BLOOD PRESSURE: 117 MMHG | DIASTOLIC BLOOD PRESSURE: 82 MMHG | BODY MASS INDEX: 29.86 KG/M2 | HEART RATE: 61 BPM | WEIGHT: 190.25 LBS | HEIGHT: 67 IN | RESPIRATION RATE: 16 BRPM | OXYGEN SATURATION: 99 %

## 2017-05-02 DIAGNOSIS — R19.8 SUPRAPUBIC FULLNESS: Primary | ICD-10-CM

## 2017-05-02 LAB
CREAT SERPL-MCNC: 1 MG/DL
EST. GFR  (AFRICAN AMERICAN): >60 ML/MIN/1.73 M^2
EST. GFR  (NON AFRICAN AMERICAN): >60 ML/MIN/1.73 M^2

## 2017-05-02 PROCEDURE — 25500020 PHARM REV CODE 255: Performed by: UROLOGY

## 2017-05-02 PROCEDURE — 52351 CYSTOURETERO & OR PYELOSCOPE: CPT | Mod: 79,RT,, | Performed by: UROLOGY

## 2017-05-02 PROCEDURE — 74420 UROGRAPHY RTRGR +-KUB: CPT | Mod: 26,,, | Performed by: UROLOGY

## 2017-05-02 PROCEDURE — 71000039 HC RECOVERY, EACH ADD'L HOUR: Performed by: UROLOGY

## 2017-05-02 PROCEDURE — 82565 ASSAY OF CREATININE: CPT

## 2017-05-02 PROCEDURE — 63600175 PHARM REV CODE 636 W HCPCS: Performed by: NURSE ANESTHETIST, CERTIFIED REGISTERED

## 2017-05-02 PROCEDURE — 37000008 HC ANESTHESIA 1ST 15 MINUTES: Performed by: UROLOGY

## 2017-05-02 PROCEDURE — 63600175 PHARM REV CODE 636 W HCPCS

## 2017-05-02 PROCEDURE — 27200651 HC AIRWAY, LMA: Performed by: NURSE ANESTHETIST, CERTIFIED REGISTERED

## 2017-05-02 PROCEDURE — 76000 FLUOROSCOPY <1 HR PHYS/QHP: CPT | Mod: 26,59,, | Performed by: UROLOGY

## 2017-05-02 PROCEDURE — C1758 CATHETER, URETERAL: HCPCS | Performed by: UROLOGY

## 2017-05-02 PROCEDURE — 25000003 PHARM REV CODE 250: Performed by: ANESTHESIOLOGY

## 2017-05-02 PROCEDURE — 36415 COLL VENOUS BLD VENIPUNCTURE: CPT

## 2017-05-02 PROCEDURE — 63600175 PHARM REV CODE 636 W HCPCS: Performed by: UROLOGY

## 2017-05-02 PROCEDURE — D9220A PRA ANESTHESIA: Mod: ANES,,, | Performed by: ANESTHESIOLOGY

## 2017-05-02 PROCEDURE — 88108 CYTOPATH CONCENTRATE TECH: CPT | Performed by: PATHOLOGY

## 2017-05-02 PROCEDURE — 25000003 PHARM REV CODE 250: Performed by: UROLOGY

## 2017-05-02 PROCEDURE — 12000002 HC ACUTE/MED SURGE SEMI-PRIVATE ROOM

## 2017-05-02 PROCEDURE — 25000003 PHARM REV CODE 250: Performed by: NURSE ANESTHETIST, CERTIFIED REGISTERED

## 2017-05-02 PROCEDURE — 52332 CYSTOSCOPY AND TREATMENT: CPT | Mod: 79,51,RT, | Performed by: UROLOGY

## 2017-05-02 PROCEDURE — 63600175 PHARM REV CODE 636 W HCPCS: Performed by: ANESTHESIOLOGY

## 2017-05-02 PROCEDURE — 49082 ABD PARACENTESIS: CPT | Mod: 79,51,, | Performed by: UROLOGY

## 2017-05-02 PROCEDURE — C1769 GUIDE WIRE: HCPCS | Performed by: UROLOGY

## 2017-05-02 PROCEDURE — 82570 ASSAY OF URINE CREATININE: CPT

## 2017-05-02 PROCEDURE — 36000707: Performed by: UROLOGY

## 2017-05-02 PROCEDURE — D9220A PRA ANESTHESIA: Mod: CRNA,,, | Performed by: NURSE ANESTHETIST, CERTIFIED REGISTERED

## 2017-05-02 PROCEDURE — 51600 INJECTION FOR BLADDER X-RAY: CPT | Mod: 79,51,, | Performed by: UROLOGY

## 2017-05-02 PROCEDURE — 88108 CYTOPATH CONCENTRATE TECH: CPT | Mod: 26,,, | Performed by: PATHOLOGY

## 2017-05-02 PROCEDURE — 36000706: Performed by: UROLOGY

## 2017-05-02 PROCEDURE — C2617 STENT, NON-COR, TEM W/O DEL: HCPCS | Performed by: UROLOGY

## 2017-05-02 PROCEDURE — 71000033 HC RECOVERY, INTIAL HOUR: Performed by: UROLOGY

## 2017-05-02 PROCEDURE — 25000003 PHARM REV CODE 250

## 2017-05-02 PROCEDURE — 37000009 HC ANESTHESIA EA ADD 15 MINS: Performed by: UROLOGY

## 2017-05-02 DEVICE — STENT PERCUFLEX 6 X 24: Type: IMPLANTABLE DEVICE | Site: URETER | Status: NON-FUNCTIONAL

## 2017-05-02 RX ORDER — HYDROCODONE BITARTRATE AND ACETAMINOPHEN 5; 325 MG/1; MG/1
1 TABLET ORAL EVERY 4 HOURS PRN
Status: DISCONTINUED | OUTPATIENT
Start: 2017-05-02 | End: 2017-05-03 | Stop reason: HOSPADM

## 2017-05-02 RX ORDER — MIDAZOLAM HYDROCHLORIDE 1 MG/ML
INJECTION INTRAMUSCULAR; INTRAVENOUS
Status: COMPLETED
Start: 2017-05-02 | End: 2017-05-02

## 2017-05-02 RX ORDER — PHENAZOPYRIDINE HYDROCHLORIDE 100 MG/1
200 TABLET, FILM COATED ORAL 3 TIMES DAILY PRN
Status: DISCONTINUED | OUTPATIENT
Start: 2017-05-02 | End: 2017-05-03 | Stop reason: HOSPADM

## 2017-05-02 RX ORDER — GLYCOPYRROLATE 0.2 MG/ML
INJECTION INTRAMUSCULAR; INTRAVENOUS
Status: COMPLETED
Start: 2017-05-02 | End: 2017-05-02

## 2017-05-02 RX ORDER — OXYBUTYNIN CHLORIDE 5 MG/1
5 TABLET ORAL 3 TIMES DAILY PRN
Status: DISCONTINUED | OUTPATIENT
Start: 2017-05-02 | End: 2017-05-03 | Stop reason: HOSPADM

## 2017-05-02 RX ORDER — ONDANSETRON 2 MG/ML
4 INJECTION INTRAMUSCULAR; INTRAVENOUS EVERY 8 HOURS PRN
Status: DISCONTINUED | OUTPATIENT
Start: 2017-05-02 | End: 2017-05-03 | Stop reason: HOSPADM

## 2017-05-02 RX ORDER — ZOLPIDEM TARTRATE 5 MG/1
10 TABLET ORAL NIGHTLY PRN
Status: DISCONTINUED | OUTPATIENT
Start: 2017-05-02 | End: 2017-05-03 | Stop reason: HOSPADM

## 2017-05-02 RX ORDER — DOCUSATE SODIUM 100 MG/1
100 CAPSULE, LIQUID FILLED ORAL 2 TIMES DAILY
Status: DISCONTINUED | OUTPATIENT
Start: 2017-05-02 | End: 2017-05-03 | Stop reason: HOSPADM

## 2017-05-02 RX ORDER — MEPERIDINE HYDROCHLORIDE 50 MG/ML
12.5 INJECTION INTRAMUSCULAR; INTRAVENOUS; SUBCUTANEOUS ONCE AS NEEDED
Status: DISCONTINUED | OUTPATIENT
Start: 2017-05-02 | End: 2017-05-02 | Stop reason: HOSPADM

## 2017-05-02 RX ORDER — CIPROFLOXACIN 2 MG/ML
400 INJECTION, SOLUTION INTRAVENOUS
Status: DISCONTINUED | OUTPATIENT
Start: 2017-05-02 | End: 2017-05-02

## 2017-05-02 RX ORDER — OXYCODONE HYDROCHLORIDE 5 MG/1
10 TABLET ORAL EVERY 4 HOURS PRN
Status: DISCONTINUED | OUTPATIENT
Start: 2017-05-02 | End: 2017-05-03 | Stop reason: HOSPADM

## 2017-05-02 RX ORDER — SODIUM CHLORIDE 0.9 % (FLUSH) 0.9 %
3 SYRINGE (ML) INJECTION EVERY 8 HOURS
Status: DISCONTINUED | OUTPATIENT
Start: 2017-05-02 | End: 2017-05-03 | Stop reason: HOSPADM

## 2017-05-02 RX ORDER — FENTANYL CITRATE 50 UG/ML
INJECTION, SOLUTION INTRAMUSCULAR; INTRAVENOUS
Status: DISCONTINUED | OUTPATIENT
Start: 2017-05-02 | End: 2017-05-02

## 2017-05-02 RX ORDER — HYDROMORPHONE HYDROCHLORIDE 2 MG/ML
0.2 INJECTION, SOLUTION INTRAMUSCULAR; INTRAVENOUS; SUBCUTANEOUS EVERY 5 MIN PRN
Status: DISCONTINUED | OUTPATIENT
Start: 2017-05-02 | End: 2017-05-02 | Stop reason: HOSPADM

## 2017-05-02 RX ORDER — HYDROMORPHONE HYDROCHLORIDE 2 MG/ML
0.5 INJECTION, SOLUTION INTRAMUSCULAR; INTRAVENOUS; SUBCUTANEOUS
Status: DISCONTINUED | OUTPATIENT
Start: 2017-05-02 | End: 2017-05-03 | Stop reason: HOSPADM

## 2017-05-02 RX ORDER — LIDOCAINE HCL/PF 100 MG/5ML
SYRINGE (ML) INTRAVENOUS
Status: DISCONTINUED | OUTPATIENT
Start: 2017-05-02 | End: 2017-05-02

## 2017-05-02 RX ORDER — CEFAZOLIN SODIUM 1 G/50ML
1 SOLUTION INTRAVENOUS
Status: DISCONTINUED | OUTPATIENT
Start: 2017-05-02 | End: 2017-05-03 | Stop reason: HOSPADM

## 2017-05-02 RX ORDER — SODIUM CHLORIDE, SODIUM LACTATE, POTASSIUM CHLORIDE, CALCIUM CHLORIDE 600; 310; 30; 20 MG/100ML; MG/100ML; MG/100ML; MG/100ML
INJECTION, SOLUTION INTRAVENOUS CONTINUOUS
Status: DISCONTINUED | OUTPATIENT
Start: 2017-05-02 | End: 2017-05-02

## 2017-05-02 RX ORDER — PROPOFOL 10 MG/ML
VIAL (ML) INTRAVENOUS
Status: DISCONTINUED | OUTPATIENT
Start: 2017-05-02 | End: 2017-05-02

## 2017-05-02 RX ORDER — SODIUM CHLORIDE 9 MG/ML
INJECTION, SOLUTION INTRAVENOUS CONTINUOUS
Status: DISCONTINUED | OUTPATIENT
Start: 2017-05-02 | End: 2017-05-03 | Stop reason: HOSPADM

## 2017-05-02 RX ORDER — CIPROFLOXACIN 2 MG/ML
INJECTION, SOLUTION INTRAVENOUS
Status: COMPLETED
Start: 2017-05-02 | End: 2017-05-02

## 2017-05-02 RX ORDER — OXYCODONE AND ACETAMINOPHEN 5; 325 MG/1; MG/1
1 TABLET ORAL EVERY 4 HOURS PRN
Status: DISCONTINUED | OUTPATIENT
Start: 2017-05-02 | End: 2017-05-03 | Stop reason: HOSPADM

## 2017-05-02 RX ORDER — ALBUTEROL SULFATE 90 UG/1
2 AEROSOL, METERED RESPIRATORY (INHALATION) EVERY 6 HOURS PRN
Status: DISCONTINUED | OUTPATIENT
Start: 2017-05-02 | End: 2017-05-03 | Stop reason: HOSPADM

## 2017-05-02 RX ORDER — METOCLOPRAMIDE HYDROCHLORIDE 5 MG/ML
INJECTION INTRAMUSCULAR; INTRAVENOUS
Status: COMPLETED
Start: 2017-05-02 | End: 2017-05-02

## 2017-05-02 RX ORDER — LORAZEPAM 2 MG/ML
0.25 INJECTION INTRAMUSCULAR ONCE AS NEEDED
Status: DISCONTINUED | OUTPATIENT
Start: 2017-05-02 | End: 2017-05-02 | Stop reason: HOSPADM

## 2017-05-02 RX ORDER — DIPHENHYDRAMINE HYDROCHLORIDE 50 MG/ML
25 INJECTION INTRAMUSCULAR; INTRAVENOUS EVERY 6 HOURS PRN
Status: DISCONTINUED | OUTPATIENT
Start: 2017-05-02 | End: 2017-05-02 | Stop reason: HOSPADM

## 2017-05-02 RX ORDER — ONDANSETRON 2 MG/ML
INJECTION INTRAMUSCULAR; INTRAVENOUS
Status: COMPLETED
Start: 2017-05-02 | End: 2017-05-02

## 2017-05-02 RX ORDER — ONDANSETRON 2 MG/ML
4 INJECTION INTRAMUSCULAR; INTRAVENOUS ONCE
Status: DISCONTINUED | OUTPATIENT
Start: 2017-05-02 | End: 2017-05-02 | Stop reason: HOSPADM

## 2017-05-02 RX ADMIN — Medication 3 ML: at 10:05

## 2017-05-02 RX ADMIN — LIDOCAINE HYDROCHLORIDE 100 MG: 20 INJECTION, SOLUTION INTRAVENOUS at 02:05

## 2017-05-02 RX ADMIN — PROMETHAZINE HYDROCHLORIDE 6.25 MG: 25 INJECTION, SOLUTION INTRAMUSCULAR; INTRAVENOUS at 04:05

## 2017-05-02 RX ADMIN — FENTANYL CITRATE 50 MCG: 50 INJECTION INTRAMUSCULAR; INTRAVENOUS at 02:05

## 2017-05-02 RX ADMIN — DOCUSATE SODIUM 100 MG: 100 CAPSULE, LIQUID FILLED ORAL at 08:05

## 2017-05-02 RX ADMIN — IOHEXOL 100 ML: 300 INJECTION, SOLUTION INTRAVENOUS at 03:05

## 2017-05-02 RX ADMIN — METOCLOPRAMIDE 10 MG: 5 INJECTION, SOLUTION INTRAMUSCULAR; INTRAVENOUS at 02:05

## 2017-05-02 RX ADMIN — SODIUM CHLORIDE: 0.9 INJECTION, SOLUTION INTRAVENOUS at 06:05

## 2017-05-02 RX ADMIN — CEFAZOLIN SODIUM 1 G: 1 SOLUTION INTRAVENOUS at 06:05

## 2017-05-02 RX ADMIN — HYDROCODONE BITARTRATE AND ACETAMINOPHEN 1 TABLET: 5; 325 TABLET ORAL at 06:05

## 2017-05-02 RX ADMIN — HYDROMORPHONE HYDROCHLORIDE 0.5 MG: 2 INJECTION INTRAMUSCULAR; INTRAVENOUS; SUBCUTANEOUS at 08:05

## 2017-05-02 RX ADMIN — MIDAZOLAM HYDROCHLORIDE 2 MG: 1 INJECTION, SOLUTION INTRAMUSCULAR; INTRAVENOUS at 02:05

## 2017-05-02 RX ADMIN — GLYCOPYRROLATE 0.2 MG: 0.2 INJECTION, SOLUTION INTRAMUSCULAR; INTRAVENOUS at 02:05

## 2017-05-02 RX ADMIN — ONDANSETRON 4 MG: 2 INJECTION INTRAMUSCULAR; INTRAVENOUS at 03:05

## 2017-05-02 RX ADMIN — CIPROFLOXACIN 400 MG: 2 INJECTION, SOLUTION INTRAVENOUS at 02:05

## 2017-05-02 RX ADMIN — SODIUM CHLORIDE, SODIUM LACTATE, POTASSIUM CHLORIDE, AND CALCIUM CHLORIDE: .6; .31; .03; .02 INJECTION, SOLUTION INTRAVENOUS at 12:05

## 2017-05-02 RX ADMIN — PROPOFOL 160 MG: 10 INJECTION, EMULSION INTRAVENOUS at 02:05

## 2017-05-02 NOTE — BRIEF OP NOTE
[facility]  Brief Operative Note     SUMMARY     Surgery Date: 5/2/2017     Surgeon(s) and Role:     * Ruma Kowalski MD - Primary    Assisting Surgeon: None    Pre-op Diagnosis:  Suprapubic fullness [R19.8]    Post-op Diagnosis:  Post-Op Diagnosis Codes:     * Suprapubic fullness [R19.8]    Procedure(s) (LRB):  CYSTOSCOPY WITH RETROGRADE PYELOGRAM (Bilateral), RIGHT URETEROSCOPY, RIGHT URETERAL STENT PLACEMENT, CYSTOGRAM, SUPRAPUBIC ASPIRATION    Anesthesia: Choice    Description of the findings of the procedure: Normal cystoscopy, L RPG. No extravasation noted from R RPG. No ureteral injury noted during R ureteroscopy. R ureteral stent placed.     Findings/Key Components: 6Fr x 24cm R ureteral stent (no string)    Estimated Blood Loss: 5cc         Specimens:   Specimen     None      Suprapubic aspirate for cytology and creatinine    #[id number]

## 2017-05-02 NOTE — TRANSFER OF CARE
Anesthesia Transfer of Care Note    Patient: Rad Fraser    Procedure(s) Performed: Procedure(s) (LRB):  CYSTOSCOPY WITH RETROGRADE PYELOGRAM (Bilateral)    Patient location: PACU    Anesthesia Type: general    Transport from OR: Transported from OR on room air with adequate spontaneous ventilation    Post pain: adequate analgesia    Post assessment: no apparent anesthetic complications    Post vital signs: stable    Level of consciousness: awake, alert and oriented    Nausea/Vomiting: no nausea/vomiting    Complications: none          Last vitals:   Visit Vitals    /76    Pulse 90    Temp 36.8 °C (98.2 °F)    Resp 18    LMP 04/16/2017    SpO2 99%

## 2017-05-02 NOTE — PROGRESS NOTES
"Arrived to unit, c/o urethral pain "5" on 1-10 pain scale. Assisted pt  to bathroom. Patient voided 50ccs of bloody urine. Will medicate for pain as per order. Will cont to monitor.  "

## 2017-05-02 NOTE — IP AVS SNAPSHOT
[place] [address] [place] [address]  Phone: [phone]           Patient Discharge Instructions   Our goal is to set you up for success. This packet includes information on your condition, medications, and your home care.  It will help you care for yourself to prevent having to return to the hospital.     Please ask your nurse if you have any questions.      There are many details to remember when preparing to leave the hospital. Here is what you will need to do:    1. Take your medicine. If you are prescribed medications, review your Medication List on the following pages. You may have new medications to  at the pharmacy and others that you'll need to stop taking. Review the instructions for how and when to take your medications. Talk with your doctor or nurses if you are unsure of what to do.     2. Go to your follow-up appointments. Specific follow-up information is listed in the following pages. Your may be contacted by a nurse or clinical provider about future appointments. Be sure we have all of the phone numbers to reach you. Please contact your provider's office if you are unable to make an appointment.     3. Watch for warning signs. Your doctor or nurse will give you detailed warning signs to watch for and when to call for assistance. These instructions may also include educational information about your condition. If you experience any of warning signs to your health, call your doctor.           [facility]  Unless otherwise directed by your provider, please   contact [facility], our nurse care line   that is available for 24/7 assistance.     [phone] (toll-free)     Registered nurses in the [facility]   provide: appointment scheduling, clinical advisement, health education, and other advisory services.                  ** Verify the list of medication(s) below is accurate and up to date. Carry this with you in case of emergency.  If your medications have changed, please notify your healthcare provider.             Medication List      START taking these medications        Additional Info                      cephALEXin 500 MG capsule   Commonly known as:  KEFLEX   Quantity:  15 capsule   Refills:  0   Dose:  500 mg    Instructions:  Take 1 capsule (500 mg total) by mouth every 8 (eight) hours.     Begin Date    AM    Noon    PM    Bedtime       oxybutynin 5 MG Tab   Commonly known as:  DITROPAN   Quantity:  30 tablet   Refills:  0   Dose:  5 mg    Instructions:  Take 1 tablet (5 mg total) by mouth 3 (three) times daily as needed (bladder spasms).     Begin Date    AM    Noon    PM    Bedtime       phenazopyridine 100 MG tablet   Commonly known as:  PYRIDIUM   Quantity:  25 tablet   Refills:  0   Dose:  200 mg    Instructions:  Take 2 tablets (200 mg total) by mouth 3 (three) times daily as needed.     Begin Date    AM    Noon    PM    Bedtime         CHANGE how you take these medications        Additional Info                      * oxycodone-acetaminophen 5-325 mg per tablet   Commonly known as:  PERCOCET   Quantity:  20 tablet   Refills:  0   Dose:  1 tablet   What changed:  Another medication with the same name was added. Make sure you understand how and when to take each.    Last time this was given:  1 tablet on 5/3/2017 12:05 PM   Instructions:  Take 1 tablet by mouth every 4 (four) hours as needed for Pain.     Begin Date    AM    Noon    PM    Bedtime       * oxycodone-acetaminophen 5-325 mg per tablet   Commonly known as:  PERCOCET   Quantity:  30 tablet   Refills:  0   Dose:  1 tablet   What changed:  You were already taking a medication with the same name, and this prescription was added. Make sure you understand how and when to take each.    Last time this was given:  1 tablet on 5/3/2017 12:05 PM   Instructions:  Take 1 tablet by mouth every 4 (four) hours as needed.     Begin Date    AM    Noon    PM    Bedtime       * Notice:   This list has 2 medication(s) that are the same as other medications prescribed for you. Read the directions carefully, and ask your doctor or other care provider to review them with you.      CONTINUE taking these medications        Additional Info                      biotin 2,500 mcg Tab   Refills:  0   Dose:  5000 mcg    Instructions:  Take 5,000 mcg by mouth once daily.     Begin Date    AM    Noon    PM    Bedtime       desvenlafaxine succinate 100 MG Tb24   Commonly known as:  PRISTIQ   Quantity:  30 tablet   Refills:  5   Dose:  100 mg    Instructions:  Take 100 mg by mouth once daily.     Begin Date    AM    Noon    PM    Bedtime       docusate sodium 100 MG capsule   Commonly known as:  COLACE   Quantity:  30 capsule   Refills:  0   Dose:  100 mg    Last time this was given:  100 mg on 5/2/2017  8:50 PM   Instructions:  Take 1 capsule (100 mg total) by mouth 2 (two) times daily.     Begin Date    AM    Noon    PM    Bedtime       M-VIT ORAL   Refills:  0   Dose:  1 tablet    Instructions:  Take 1 tablet by mouth once daily.     Begin Date    AM    Noon    PM    Bedtime       PROAIR HFA 90 mcg/actuation inhaler   Refills:  0   Dose:  2 puff   Generic drug:  albuterol    Instructions:  Inhale 2 puffs into the lungs every 6 (six) hours as needed for Wheezing. Rescue     Begin Date    AM    Noon    PM    Bedtime       VITAMIN C 1000 MG tablet   Refills:  0   Dose:  1000 mg   Generic drug:  ascorbic acid (vitamin C)    Instructions:  Take 1,000 mg by mouth once daily.     Begin Date    AM    Noon    PM    Bedtime       zolpidem 10 mg Tab   Commonly known as:  AMBIEN   Quantity:  30 tablet   Refills:  0   Dose:  10 mg    Instructions:  Take 1 tablet (10 mg total) by mouth nightly as needed.     Begin Date    AM    Noon    PM    Bedtime            Where to Get Your Medications      You can get these medications from any pharmacy     Bring a paper prescription for each of these medications     cephALEXin 500 MG  capsule    oxybutynin 5 MG Tab    oxycodone-acetaminophen 5-325 mg per tablet    phenazopyridine 100 MG tablet                  Please bring to all follow up appointments:    1. A copy of your discharge instructions.  2. All medicines you are currently taking in their original bottles.  3. Identification and insurance card.    Please arrive 15 minutes ahead of scheduled appointment time.    Please call 24 hours in advance if you must reschedule your appointment and/or time.        Your Scheduled Appointments     May 04, 2017 10:55 AM CDT   New Patient Problem-OCC with Mary Casillas MD   [facility] (O[facility])    [address] [facility] [address]   [phone]            May 08, 2017 10:00 AM CDT   Post OP with Ruma Kowalski MD   [facility] - Urology ([facility])    [address] [facility] [address]  [facility] [address]   [phone]            May 09, 2017  8:40 AM CDT   Consult with Lesley Shah MD   [facility] - OB/ GYN ([facility])    [address] [facility] [address]  [facility] [address]   [phone]              Follow-up Information     Follow up with Ruma Kowalski MD. Go on 5/8/2017.    Specialty:  Urology    Why:  For Appointment on Monday 5/8/2017 @ 10AM    Contact information:    [address] [place] [address]  [address]  [facility] [address]  [phone]          Follow up with Mary Daly MD. Go on 5/4/2017.    Specialty:  Obstetrics and Gynecology    Why:  For Appointment on Thursday 5/4/2017 @ 10:55AM    Contact information:    [address] [place] [address]  [address]  [facility] [address]  [phone]          Discharge Instructions     Future Orders    Activity as tolerated     Diet general     Questions:    Total calories:      Fat restriction, if any:      Protein restriction, if any:      Na restriction, if any:      Fluid restriction:      Additional restrictions:          Primary Diagnosis     Your primary diagnosis was:  Abdominal Fullness      Admission Information   "   Date & Time Provider Department CSN    5/2/2017 11:54 AM Ruma Kowalski MD [facility] [id number]      Care Providers     Provider Role Specialty Primary office phone    Ruma Kowalski MD Attending Provider Urology [phone]    Ruma Kowalski MD Surgeon  Urology [phone]      Your Vitals Were     BP Pulse Temp Resp Height Weight    115/72 (BP Location: Left arm, Patient Position: Lying, BP Method: Automatic) 71 98.2 °F (36.8 °C) (Oral) 18 5' 7" (1.702 m) 86.3 kg (190 lb 4.1 oz)    Last Period SpO2 BMI          04/16/2017 100% 29.8 kg/m2        Recent Lab Values        6/15/2015                           9:01 AM           A1C 5.3                       Pending Labs     Order Current Status    Cytology, urine Collected (05/02/17 1510)     In process    Cytology Specimen-Medical Cytology (Fluid/Wash/Brush) In process      Allergies as of 5/3/2017        Reactions    No Known Allergies       Advance Directives     An advance directive is a document which, in the event you are no longer able to make decisions for yourself, tells your healthcare team what kind of treatment you do or do not want to receive, or who you would like to make those decisions for you.  If you do not currently have an advance directive, [facility] encourages you to create one.  For more information call:  [phone]), [phone] ([phone]),  or log on to [email or web address].        Language Assistance Services     ATTENTION: Language assistance services are available, free of charge. Please call [phone].      ATENCIÓN: Si habla español, tiene a beltran disposición servicios gratuitos de asistencia lingüística. Llame al [phone].     CHÚ Ý: N?u b?n nói Ti?ng Vi?t, có các d?ch v? h? tr? ngôn ng? mi?n phí dành cho b?n. G?i s? [phone].         [facility] complies with applicable Federal civil rights laws and does not discriminate on the basis " of race, color, national origin, age, disability, or sex.

## 2017-05-02 NOTE — ANESTHESIA PREPROCEDURE EVALUATION
05/02/2017  Rad Fraser is a 41 y.o., female.    Anesthesia Evaluation    I have reviewed the Patient Summary Reports.     I have reviewed the Medications.     Review of Systems  Anesthesia Hx:  History of prior surgery of interest to airway management or planning:  Denies Personal Hx of Anesthesia complications.   Social:  Non-Smoker    Hematology/Oncology:  Hematology Normal        EENT/Dental:   chronic allergic rhinitis   Cardiovascular:  Cardiovascular Normal     Pulmonary:   Asthma    Renal/:  Renal/ Normal     Hepatic/GI:  Hepatic/GI Normal    Psych:   Psychiatric History anxiety depression          Physical Exam  General:  Well nourished    Airway/Jaw/Neck:  Airway Findings: Mouth Opening: Normal Tongue: Normal  General Airway Assessment: Adult  Mallampati: II  TM Distance: 4 - 6 cm  Jaw/Neck Findings:  Neck ROM: Normal ROM      Dental:  Dental Findings: In tact   Chest/Lungs:  Chest/Lungs Findings: Normal Respiratory Rate     Heart/Vascular:  Heart Findings: Rate: Normal        Mental Status:  Mental Status Findings:  Cooperative         Anesthesia Plan  Type of Anesthesia, risks & benefits discussed:  Anesthesia Type:  general  Patient's Preference:   Intra-op Monitoring Plan: standard ASA monitors  Intra-op Monitoring Plan Comments:   Post Op Pain Control Plan:   Post Op Pain Control Plan Comments:   Induction:   IV  Beta Blocker:  Patient is not currently on a Beta-Blocker (No further documentation required).       Informed Consent: Patient understands risks and agrees with Anesthesia plan.  Questions answered. Anesthesia consent signed with patient.  ASA Score: 2     Day of Surgery Review of History & Physical:    H&P update referred to the provider.         Ready For Surgery From Anesthesia Perspective.

## 2017-05-02 NOTE — INTERVAL H&P NOTE
The patient has been examined and the H&P has been reviewed:    I concur with the findings and no changes have occurred since H&P was written.    Anesthesia/Surgery risks, benefits and alternative options discussed and understood by patient/family.          Active Hospital Problems    Diagnosis  POA    Suprapubic fullness [R19.8]  Yes      Resolved Hospital Problems    Diagnosis Date Resolved POA   No resolved problems to display.

## 2017-05-03 ENCOUNTER — TELEPHONE (OUTPATIENT)
Dept: OBSTETRICS AND GYNECOLOGY | Facility: CLINIC | Age: 42
End: 2017-05-03

## 2017-05-03 VITALS
DIASTOLIC BLOOD PRESSURE: 72 MMHG | RESPIRATION RATE: 18 BRPM | TEMPERATURE: 98 F | SYSTOLIC BLOOD PRESSURE: 115 MMHG | HEIGHT: 67 IN | OXYGEN SATURATION: 100 % | WEIGHT: 190.25 LBS | BODY MASS INDEX: 29.86 KG/M2 | HEART RATE: 71 BPM

## 2017-05-03 LAB
ANION GAP SERPL CALC-SCNC: 9 MMOL/L
BASOPHILS # BLD AUTO: 0.02 K/UL
BASOPHILS NFR BLD: 0.2 %
BODY FLUID SOURCE, CREATININE: NORMAL
BUN SERPL-MCNC: 11 MG/DL
CALCIUM SERPL-MCNC: 9 MG/DL
CANCER AG125 SERPL-ACNC: 20 U/ML
CHLORIDE SERPL-SCNC: 107 MMOL/L
CO2 SERPL-SCNC: 24 MMOL/L
CREAT FLD-MCNC: 1.1 MG/DL
CREAT SERPL-MCNC: 0.9 MG/DL
DIFFERENTIAL METHOD: ABNORMAL
EOSINOPHIL # BLD AUTO: 0.3 K/UL
EOSINOPHIL NFR BLD: 2.7 %
ERYTHROCYTE [DISTWIDTH] IN BLOOD BY AUTOMATED COUNT: 12.4 %
EST. GFR  (AFRICAN AMERICAN): >60 ML/MIN/1.73 M^2
EST. GFR  (NON AFRICAN AMERICAN): >60 ML/MIN/1.73 M^2
GLUCOSE SERPL-MCNC: 107 MG/DL
HCT VFR BLD AUTO: 39.7 %
HGB BLD-MCNC: 13.6 G/DL
LYMPHOCYTES # BLD AUTO: 2.2 K/UL
LYMPHOCYTES NFR BLD: 22.3 %
MCH RBC QN AUTO: 31.3 PG
MCHC RBC AUTO-ENTMCNC: 34.3 %
MCV RBC AUTO: 92 FL
MONOCYTES # BLD AUTO: 1 K/UL
MONOCYTES NFR BLD: 9.7 %
NEUTROPHILS # BLD AUTO: 6.4 K/UL
NEUTROPHILS NFR BLD: 65 %
PLATELET # BLD AUTO: 261 K/UL
PMV BLD AUTO: 10.8 FL
POTASSIUM SERPL-SCNC: 4.7 MMOL/L
RBC # BLD AUTO: 4.34 M/UL
SODIUM SERPL-SCNC: 140 MMOL/L
WBC # BLD AUTO: 9.83 K/UL

## 2017-05-03 PROCEDURE — 85025 COMPLETE CBC W/AUTO DIFF WBC: CPT

## 2017-05-03 PROCEDURE — 99024 POSTOP FOLLOW-UP VISIT: CPT | Mod: ,,, | Performed by: UROLOGY

## 2017-05-03 PROCEDURE — 25000003 PHARM REV CODE 250: Performed by: UROLOGY

## 2017-05-03 PROCEDURE — 36415 COLL VENOUS BLD VENIPUNCTURE: CPT

## 2017-05-03 PROCEDURE — 80048 BASIC METABOLIC PNL TOTAL CA: CPT

## 2017-05-03 PROCEDURE — 86304 IMMUNOASSAY TUMOR CA 125: CPT

## 2017-05-03 PROCEDURE — 63600175 PHARM REV CODE 636 W HCPCS: Performed by: UROLOGY

## 2017-05-03 RX ORDER — OXYCODONE AND ACETAMINOPHEN 5; 325 MG/1; MG/1
1 TABLET ORAL EVERY 4 HOURS PRN
Qty: 30 TABLET | Refills: 0 | Status: SHIPPED | OUTPATIENT
Start: 2017-05-03 | End: 2017-05-26 | Stop reason: SDUPTHER

## 2017-05-03 RX ORDER — OXYBUTYNIN CHLORIDE 5 MG/1
5 TABLET ORAL 3 TIMES DAILY PRN
Qty: 30 TABLET | Refills: 0 | Status: SHIPPED | OUTPATIENT
Start: 2017-05-03 | End: 2017-05-14 | Stop reason: SDUPTHER

## 2017-05-03 RX ORDER — PHENAZOPYRIDINE HYDROCHLORIDE 100 MG/1
200 TABLET, FILM COATED ORAL 3 TIMES DAILY PRN
Qty: 25 TABLET | Refills: 0 | Status: SHIPPED | OUTPATIENT
Start: 2017-05-03 | End: 2017-05-08

## 2017-05-03 RX ORDER — CEPHALEXIN 500 MG/1
500 CAPSULE ORAL EVERY 8 HOURS
Qty: 15 CAPSULE | Refills: 0 | Status: SHIPPED | OUTPATIENT
Start: 2017-05-03 | End: 2017-05-08

## 2017-05-03 RX ADMIN — CEFAZOLIN SODIUM 1 G: 1 SOLUTION INTRAVENOUS at 01:05

## 2017-05-03 RX ADMIN — ONDANSETRON 4 MG: 2 INJECTION INTRAMUSCULAR; INTRAVENOUS at 11:05

## 2017-05-03 RX ADMIN — SODIUM CHLORIDE: 0.9 INJECTION, SOLUTION INTRAVENOUS at 10:05

## 2017-05-03 RX ADMIN — HYDROMORPHONE HYDROCHLORIDE 0.5 MG: 2 INJECTION INTRAMUSCULAR; INTRAVENOUS; SUBCUTANEOUS at 08:05

## 2017-05-03 RX ADMIN — CEFAZOLIN SODIUM 1 G: 1 SOLUTION INTRAVENOUS at 10:05

## 2017-05-03 RX ADMIN — OXYCODONE HYDROCHLORIDE AND ACETAMINOPHEN 1 TABLET: 5; 325 TABLET ORAL at 12:05

## 2017-05-03 RX ADMIN — HYDROMORPHONE HYDROCHLORIDE 0.5 MG: 2 INJECTION INTRAMUSCULAR; INTRAVENOUS; SUBCUTANEOUS at 01:05

## 2017-05-03 RX ADMIN — Medication 3 ML: at 06:05

## 2017-05-03 NOTE — PROGRESS NOTES
TN notified pt that earliest appointment for Dr. Shah was 5/24/2017. Pt states that she would like for TN to find any Ob/GYN who can see her sooner.    TN called Radha @ Dr. Warner office  [phone]. She scheduled pts appt for tomorrow 5/4/2017 @ 10:55AM.     Information added to follow up tab of pt's chart.

## 2017-05-03 NOTE — OP NOTE
DATE OF PROCEDURE:  05/02/2017    SURGEON:  Ruma Kowalski M.D.    PREOPERATIVE DIAGNOSES:  Suprapubic fullness and fluid collection.    POSTOPERATIVE DIAGNOSES:  Suprapubic fullness and fluid collection.    PROCEDURES PERFORMED:  Cystoscopy with bilateral retrograde pyelogram, right   ureteroscopy, right ureteral stent placement, cystogram and suprapubic   aspiration.    INDICATIONS FOR PROCEDURE:  Ms. Fraser is a 41-year-old female who underwent   recent retropubic mid urethral sling placement.  She was seen in routine   postoperative visit yesterday and was noted to have a bladder scan that showed   large residual of urine.  Catheter irrigation was performed, which did not   demonstrate urinary retention and therefore she underwent cystoscopy that was   deemed normal and CT scan which showed a large fluid collection in the   suprapubic area superior to the bladder.  Ureteral injury could not be ruled out   completely on the CT scan and therefore she presents today for further   evaluation of this fluid collection.    DESCRIPTION OF PROCEDURE:  Ms. Fraser was brought to the Operating Room and   placed under general LMA anesthesia.  Full timeout procedures were performed   identifying the correct patient and procedure.  Appropriate IV antibiotics with   ciprofloxacin were given prior to commencement of surgery.  The patient was   placed in the dorsal lithotomy position and prepped and draped in the usual   sterile fashion.  She was prepped up on the abdomen as well for the aspiration   of the fluid.    A 22-Austrian rigid scope was passed per urethra and into the bladder.  Full   cystoscopy was performed with a 30-degree and a 70-degree lens, which showed no   abnormalities throughout the entirety of the bladder.  No injuries, mesh,   erosion, or other abnormalities were noted.    With a 30-degree lens, a retrograde pyelogram was performed first on the   patient's left side.  This was done with a cone-tipped  ureteral catheter.    Retrograde pyelogram was noted to be normal with no extravasation of contrast   from the left ureter.  No filling defects noted.    Similar procedure was performed on the patient's right side.  A cone-tipped   ureteral catheter was placed in the distal aspect of the right ureter and gentle   retrograde pyelogram was performed with full strength Omnipaque solution.    There was no extravasation of contrast that could be demonstrated with the   retrograde pyelogram.  Normal caliber ureter was noted throughout the entirety.    There were no filling defects or other abnormalities noted.  There was noted to   be a wisp of contrast, which was felt to be likely contrast in the bladder at the   time of retrograde pyelogram.  No active extravasation could be definitively   identified on multiple attempts with the retrograde pyelogram.    Next, a 0.038 sensor wire was passed through the lumen of the right ureter and   passed up to the level of the kidney as confirmed on fluoroscopy.  No resistance   or other abnormalities were noted with placement of the wire.  The bladder was   then drained and then we proceeded with semi-rigid ureteroscopy of the right   ureter.  The semi-rigid ureteroscope was able to pass without issue into the   distal aspect of the right ureteral orifice.  This was passed up to the level of   the mid ureter and was noted to be normal in appearance.  The distal aspect of   the ureter was carefully inspected and no abnormalities including stricture,   erythema, puncture site or mesh was noted in the right ureter.  The ureteroscope   was then removed as no injury was noted.    Next, I proceeded with cystogram of the bladder.  A 16-Thai Zaldivar catheter was   placed per urethra and into the bladder with 10 mL of sterile water placed into   the balloon.  A 60 mL of Omnipaque solution was placed into the bladder and the   bladder was then filled with normal saline to a capacity of  approximately 200   mL.  There was no extravasation noted from the bladder.  The contrast was then   drained and the Zaldivar catheter was removed.    Next, the 22-Rwandan rigid scope was replaced back into the bladder.  This was   done over the wire for placement of a right ureteral stent.  Ureteral stent was   placed for maximum decompression.  A 6-Rwandan x 24 cm JJ stent was then passed   over the wire and up the level of the kidney.  The wire was removed and a curl   was noted in the proximal portion of the stent into the lower pole and a good   curl was noted in the bladder.  Again, no resistance was noted upon placement of   the stent into the kidney.  The bladder was then drained and the cystoscope was   removed.    Next, we proceeded with aspiration of the suprapubic fluid collection.    Preoperative imaging showed this fluid collection just superior to the bladder   and was palpable on exam.  An 18-gauge spinal needle was passed into the fluid   collection and approximately 300 mL of cloudy yellow fluid was aspirated.  A   specimen was sent for cytology as well as creatinine.  The spinal needle was   removed without incident.    The patient was then awakened from general anesthesia and transferred to the   PACU in stable condition.    COMPLICATIONS:  None.    FINDINGS:  Normal cystoscopy and normal left retrograde pyelogram.  Unable to   demonstrate extravasation from the right ureter.  No ureteral injury noted on   direct visualization with the right ureteroscopy.  Right ureteral stent placed.    ESTIMATED BLOOD LOSS:  5 mL.    SPECIMENS:  Fluid aspirate for creatinine and cytology.    DRAINS:  A 6-Rwandan x 24 cm right double-J ureteral stent, no string.    ESTIMATED BLOOD LOSS:  5 mL.    PATIENT DISPOSITION:  The patient is stable and will be transferred to the   regular nursing floor.  She will undergo procedure with Interventional Radiology   tomorrow for placement of a pigtail catheter into the fluid  collection to allow   for maximal drainage.  The fluid collection is still undetermined of the   etiology and we will await laboratory results to help guide further intervention   and workup.  We will plan for reimaging the area once the fluid collection has   been drained.      EKP/SN  dd: 05/02/2017 17:23:51 (CDT)  td: 05/02/2017 22:43:28 (CDT)  Doc ID   #[id number]  Job ID #[id number]    CC:

## 2017-05-03 NOTE — DISCHARGE SUMMARY
[facility]  Urology  Discharge Summary      Patient Name: Rad Fraser  MRN: [id number]  Admission Date: 5/2/2017  Hospital Length of Stay: 1 days  Discharge Date and Time:  05/03/2017 10:45 AM  Attending Physician: Ruma Rojas MD   Discharging Provider: Ruma Rojas MD  Primary Care Physician: Gena Osorio MD    HPI:   Cystoscopy with bilateral retrograde pyelogram and right stent placement on 5/2/2017.  She stayed overnight in order to have IR place a drain in her pelvic fluid collection.  She has minor pains.  She is voiding with improving dysuria.  She denies hematuria or fever.    Procedure(s) (LRB):  CYSTOSCOPY WITH RETROGRADE PYELOGRAM (Bilateral)  PLACEMENT-STENT URETERAL (Right)  ASPIRATION-fluid (N/A)     Indwelling Lines/Drains at time of discharge:   Lines/Drains/Airways     Drain                 Ureteral Drain/Stent 05/02/17 1513 Right ureter 6 Fr. less than 1 day                Hospital Course (synopsis of major diagnoses, care, treatment, and services provided during the course of the hospital stay): Uncomplicated procedure on HD 1. Admitted for IR perc drainage of fluid collection. Upon evaluation by IR, felt to possibly by ovarian in nature and drain held. Discussed with gyn. Will obtain CA-125 and plan for gyn follow up ASAP.    Consults:   Consults         Status Ordering Provider     Inpatient consult to Interventional Radiology  Once     Provider:  (Not yet assigned)    Acknowledged RUMA ROJAS          Significant Diagnostic Studies: Pelvic US    Pending Diagnostic Studies:     Procedure Component Value Units Date/Time     [[id number]]     Order Status:  Sent Lab Status:  No result     Specimen:  Blood from Blood     SURG FL Surgery Fluoro Less Than 1 Hour [[id number]] Updated:  05/02/17 1550    Order Status:  Sent Lab Status:  In process           Final Active Diagnoses:    Diagnosis Date Noted POA    PRINCIPAL PROBLEM:  Suprapubic  fullness [R19.8] 05/02/2017 Yes      Problems Resolved During this Admission:    Diagnosis Date Noted Date Resolved POA         Discharged Condition: good    Disposition: Home or Self Care    Follow Up:  Follow-up Information     Follow up with Ruma Kowalski MD. Go on 5/8/2017.    Specialty:  Urology    Why:  For Appointment on Monday 5/8/2017 @ 10AM    Contact information:    [address]  [address]  [place] [address]  [phone]          Follow up with Lesley Shah MD In 2 days.    Specialty:  Obstetrics and Gynecology    Contact information:    [address]  [address]  [place] [address]  [phone]          Patient Instructions:     Diet general     Activity as tolerated       Medications:  Reconciled Home Medications:   Current Discharge Medication List      START taking these medications    Details   cephALEXin (KEFLEX) 500 MG capsule Take 1 capsule (500 mg total) by mouth every 8 (eight) hours.  Qty: 15 capsule, Refills: 0    Associated Diagnoses: Suprapubic fullness      oxybutynin (DITROPAN) 5 MG Tab Take 1 tablet (5 mg total) by mouth 3 (three) times daily as needed (bladder spasms).  Qty: 30 tablet, Refills: 0      phenazopyridine (PYRIDIUM) 100 MG tablet Take 2 tablets (200 mg total) by mouth 3 (three) times daily as needed.  Qty: 25 tablet, Refills: 0         CONTINUE these medications which have CHANGED    Details   !! oxycodone-acetaminophen (PERCOCET) 5-325 mg per tablet Take 1 tablet by mouth every 4 (four) hours as needed.  Qty: 30 tablet, Refills: 0    Associated Diagnoses: Suprapubic fullness       !! - Potential duplicate medications found. Please discuss with provider.      CONTINUE these medications which have NOT CHANGED    Details   albuterol (PROAIR HFA) 90 mcg/actuation inhaler Inhale 2 puffs into the lungs every 6 (six) hours as needed for Wheezing. Rescue      ascorbic acid, vitamin C, (VITAMIN C) 1000 MG tablet Take 1,000 mg by mouth once daily.      biotin 2,500 mcg  Tab Take 5,000 mcg by mouth once daily.      desvenlafaxine succinate (PRISTIQ) 100 MG Tb24 Take 100 mg by mouth once daily.  Qty: 30 tablet, Refills: 5    Associated Diagnoses: Mixed anxiety and depressive disorder      docusate sodium (COLACE) 100 MG capsule Take 1 capsule (100 mg total) by mouth 2 (two) times daily.  Qty: 30 capsule, Refills: 0      !! oxycodone-acetaminophen (PERCOCET) 5-325 mg per tablet Take 1 tablet by mouth every 4 (four) hours as needed for Pain.  Qty: 20 tablet, Refills: 0      PV W-O RAAD/FERROUS FUMARATE/FA (M-VIT ORAL) Take 1 tablet by mouth once daily.      zolpidem (AMBIEN) 10 mg Tab Take 1 tablet (10 mg total) by mouth nightly as needed.  Qty: 30 tablet, Refills: 0    Associated Diagnoses: Insomnia, unspecified type       !! - Potential duplicate medications found. Please discuss with provider.          Time spent on the discharge of patient: 20 minutes    Ruma Kowalski MD  Urology  [facility]

## 2017-05-03 NOTE — PROGRESS NOTES
TN reviewed follow up appointment information and was informed of [facility]. Patient is in agreement and verbalized an understanding. Placed discharge information in blue discharge folder.  Patient was reminded to call the number written on the dry erase board for any oather questions, concerns, or needs.    Patients nurse, Lashay,  informed that patient can discharge from  standpoint once lab is completed.

## 2017-05-03 NOTE — PLAN OF CARE
Problem: Pain, Acute (Adult)  Goal: Identify Related Risk Factors and Signs and Symptoms  Related risk factors and signs and symptoms are identified upon initiation of Human Response Clinical Practice Guideline (CPG)  Outcome: Ongoing (interventions implemented as appropriate)    05/03/17 0307   Pain, Acute   Related Risk Factors (Acute Pain) other (see comments)  (R uretheral stents placed.)   Signs and Symptoms (Acute Pain) verbalization of pain descriptors      Pain controlled with IV dilaudid. Pt resting comfortably with no distress noted. Family member at BS. POC continued.

## 2017-05-03 NOTE — ASSESSMENT & PLAN NOTE
IR to drain pelvic fluid collection today  D/c home after IR procedure  Follow up next week with Dr. Kowalski

## 2017-05-03 NOTE — ANESTHESIA POSTPROCEDURE EVALUATION
"Anesthesia Post Evaluation    Patient: Rad Fraser    Procedure(s) Performed: Procedure(s) (LRB):  CYSTOSCOPY WITH RETROGRADE PYELOGRAM (Bilateral)  PLACEMENT-STENT URETERAL (Right)  ASPIRATION-fluid (N/A)    Final Anesthesia Type: general  Patient location during evaluation: PACU  Patient participation: Yes- Able to Participate  Level of consciousness: awake and alert and oriented  Post-procedure vital signs: reviewed and stable  Pain management: adequate  Airway patency: patent  PONV status at discharge: No PONV  Anesthetic complications: no      Cardiovascular status: blood pressure returned to baseline and hemodynamically stable  Respiratory status: unassisted and spontaneous ventilation  Hydration status: euvolemic  Follow-up not needed.        Visit Vitals    /63 (Patient Position: Lying, BP Method: Automatic)    Pulse 66    Temp 37 °C (98.6 °F) (Oral)    Resp 18    Ht 5' 7" (1.702 m)    Wt 86.3 kg (190 lb 4.1 oz)    LMP 04/16/2017    SpO2 98%    Breastfeeding No    BMI 29.8 kg/m2       Pain/Ashly Score: Pain Assessment Performed: Yes (5/2/2017  8:47 PM)  Presence of Pain: complains of pain/discomfort (5/2/2017  8:47 PM)  Pain Rating Prior to Med Admin: 5 (5/2/2017  8:47 PM)  Ashly Score: 10 (5/2/2017  8:47 PM)      "

## 2017-05-03 NOTE — PROGRESS NOTES
"Pamela, Dr Shah's nurse, returned TNs call. Pamela stated that the earliest appt she could schedule will be on Wednesday 5/24/2017 @ 9AM. Pamela stated that if she is able to schedule pt earlier, she will call pt. Information added to "follow up" tab of pts EMR.   "

## 2017-05-03 NOTE — NURSING
D/C teaching given to patient, pt stated and understanding of followup appt, medications to start taking and was given Rx to get filled. IV d/c'd, cath tip intact, pressure held and pt tolerated well. Pt wheeled out to 3rd floor parking garage where friend picked her up. Pt in no distress.

## 2017-05-03 NOTE — NURSING
Called lab to find out when blood draw would be preformed. Was told that blood from AM draw was used for  test.

## 2017-05-03 NOTE — PROGRESS NOTES
[facility]  Urology  Progress Note    Patient Name: Rad Fraser  MRN: [id number]  Admission Date: 5/2/2017  Hospital Length of Stay: 1 days  Code Status: Full Code   Attending Provider: Ruma Kowalski MD   Primary Care Physician: Gena Osorio MD    Subjective:     HPI:  Cystoscopy with bilateral retrograde pyelogram and right stent placement on 5/2/2017.  She stayed overnight in order to have IR place a drain in her pelvic fluid collection.  She has minor pains.  She is voiding with improving dysuria.  She denies hematuria or fever.    Interval History: No events overnight.    Review of Systems   Constitutional: Negative.    HENT: Negative.    Eyes: Negative.    Respiratory: Negative for cough, chest tightness and shortness of breath.    Cardiovascular: Negative for chest pain.   Gastrointestinal: Negative.  Negative for constipation, diarrhea and nausea.   Musculoskeletal: Negative.    Neurological: Negative.    Psychiatric/Behavioral: Negative.      Objective:     Temp:  [97.4 °F (36.3 °C)-98.6 °F (37 °C)] 98.6 °F (37 °C)  Pulse:  [61-90] 66  Resp:  [15-20] 18  SpO2:  [98 %-100 %] 98 %  BP: (115-129)/(63-82) 115/63     Body mass index is 29.8 kg/(m^2).            Drains     Drain                 Ureteral Drain/Stent 05/02/17 1513 Right ureter 6 Fr. less than 1 day                Physical Exam   Nursing note and vitals reviewed.  Constitutional: She is oriented to person, place, and time. She appears well-developed.   HENT:   Head: Normocephalic.   Eyes: Conjunctivae are normal.   Neck: Normal range of motion. No tracheal deviation present. No thyromegaly present.   Cardiovascular: Normal rate, normal heart sounds and normal pulses.    Pulmonary/Chest: Effort normal and breath sounds normal. No respiratory distress. She has no wheezes.   Abdominal: Soft. She exhibits no distension and no mass. There is no hepatosplenomegaly. There is no tenderness. There is no rebound, no guarding and  no CVA tenderness. No hernia.   Musculoskeletal: Normal range of motion. She exhibits no edema or tenderness.   Lymphadenopathy:     She has no cervical adenopathy.   Neurological: She is alert and oriented to person, place, and time.   Skin: Skin is warm and dry. No rash noted. No erythema.     Psychiatric: She has a normal mood and affect. Her behavior is normal. Judgment and thought content normal.       Significant Labs:    BMP:    Recent Labs  Lab 05/01/17  1308 05/02/17  1510 05/03/17  0441   NA  --   --  140   K  --   --  4.7   CL  --   --  107   CO2  --   --  24   BUN  --   --  11   CREATININE 0.9 1.0 0.9   CALCIUM  --   --  9.0       CBC:     Recent Labs  Lab 05/03/17  0441   WBC 9.83   HGB 13.6   HCT 39.7          Blood Culture: No results for input(s): LABBLOO in the last 168 hours.  Urine Culture: No results for input(s): LABURIN in the last 168 hours.    Significant Imaging:                    Assessment/Plan:     * Suprapubic fullness  IR to drain pelvic fluid collection today  D/c home after IR procedure  Follow up next week with Dr. Kowalski      VTE Risk Mitigation         Ordered     Low Risk of VTE  Once      05/02/17 1805          W Jose F Molina MD  Urology  [facility]

## 2017-05-03 NOTE — CONSULTS
Pelvic US performed. Cannot exclude left ovarian origin of collection. Discussed with Dr. Kowalski.

## 2017-05-03 NOTE — PLAN OF CARE
05/03/17 1237   Final Note   Assessment Type Final Discharge Note   Discharge Disposition Home   Discharge planning education complete? Yes   What phone number can be called within the next 1-3 days to see how you are doing after discharge? [phone]   Hospital Follow Up  Appt(s) scheduled? Yes   Discharge plans and expectations educations in teach back method with documentation complete? Yes   Offered [facility] -- Bedside Delivery? n/a   Discharge/Hospital Encounter Summary to (non-[facility]) PCP No   Referral to Outpatient Case Management complete? No   Referral to / orders for Home Health Complete? No   30 day supply of medicines given at discharge, if documented non-compliance / non-adherence? No   Any social issues identified prior to discharge? No   Did you assess the readiness or willingness of the family or caregiver to support self management of care? Yes   Right Care Referral Info   Post Acute Recommendation No Care

## 2017-05-03 NOTE — PROGRESS NOTES
WRITTEN HEALTHCARE DISCHARGE INFORMATION     Things that YOU are responsible for to Manage Your Care At Home:  1. Getting your prescriptions filled.  2. Taking you medications as directed. DO NOT MISS ANY DOSES!  3. Going to your follow-up doctor appointments. This is important because it allows the doctor to monitor your progress and to determine if any changes need to be made to your treatment plan.    If you are unable to make your follow up appointments, please call the number listed and reschedule this appointment.     After discharge, if you need assistance, you can call [facility] Line for 24/7 assistance at [phone]    Thank you for choosing [facility] and allowing us to care for you.   From your care management team: Jennifer Petersen [phone] or [phone]     You should receive a call from [facility] within 48-72 hours to help manage your care after discharge. Please try to make sure that you answer your phone for this important phone call.     Follow-up Information     Follow up with Ruma Kowalski MD. Go on 5/8/2017.    Specialty:  Urology    Why:  For Appointment on Monday 5/8/2017 @ 10AM    Contact information:    [address]  [address]  [place] [address]  [phone]          Follow up with Mary Daly MD. Go on 5/4/2017.    Specialty:  Obstetrics and Gynecology    Why:  For Appointment on Thursday 5/4/2017 @ 10:55AM    Contact information:    [address]  [address]  [place] [address]  [phone]

## 2017-05-03 NOTE — TELEPHONE ENCOUNTER
----- Message from Luisa Rivera sent at 5/3/2017 10:52 AM CDT -----  Contact: Trinity  at [facility]   Dr. Kowalski wants the pt to see Dr. Shah in two days. She is a new patient. However, Dr. Kowalski said she might in GYN surgery. Please advise at [phone]. Thanks!   -------------------------------------------------------------------  05/03/14 @ 1146 AM    SPOKE WITH ERASTO HUGGINS FOR PT AT [facility],   DR KOWALSKI IS REQUESTING AN APPT FOR PT WITH IN TWO DAY OF DISCHARGE FROM [facility]. PT HAS BEEN HAVING FLUID BUILD UP IN HER PELVIS WITH PELVIC PAIN,   APPT MADE FOR 5/24/17 @ 9 AM, PT PUT ON WAIT LIST, ALSO MESSAGE SENT TO DR SHAH TO SEE IF SHE CAN FIT PT IN EARLIER  ---------------------------------------------------  05/03/17 @ 5859  CALL PLACE TO TRINITY MAURER MGR @ [facility]. MESSAGE LEFT TO INFORM HER THAT DR SHAH WAS NOTIFIED OF PT'S STATUS, AND SHE  WILL BE CONTACTING DR KOWALSKI AND THE PT ABOUT HER DIAGNOSIS AND A NEW APPT DAY & TIME.  ----------------------------------------------------------  05/03/17 @ 1446P  CALL PLACED TO TRINITY MAURER MGR @ [facility] ,MESSAGE LEFT TO INFORM HER THAT DR SHAH CHANGED THE PT'S APPT TIME TO 5/9/17 @ 0840AM

## 2017-05-03 NOTE — PROGRESS NOTES
TN called Dr. Lesley Shah's office [phone] to schedule follow up appt. Detailed message left with Dr. Shah's nurse. TN awaiting return call.

## 2017-05-03 NOTE — SUBJECTIVE & OBJECTIVE
Interval History: No events overnight.    Review of Systems   Constitutional: Negative.    HENT: Negative.    Eyes: Negative.    Respiratory: Negative for cough, chest tightness and shortness of breath.    Cardiovascular: Negative for chest pain.   Gastrointestinal: Negative.  Negative for constipation, diarrhea and nausea.   Musculoskeletal: Negative.    Neurological: Negative.    Psychiatric/Behavioral: Negative.      Objective:     Temp:  [97.4 °F (36.3 °C)-98.6 °F (37 °C)] 98.6 °F (37 °C)  Pulse:  [61-90] 66  Resp:  [15-20] 18  SpO2:  [98 %-100 %] 98 %  BP: (115-129)/(63-82) 115/63     Body mass index is 29.8 kg/(m^2).            Drains     Drain                 Ureteral Drain/Stent 05/02/17 1513 Right ureter 6 Fr. less than 1 day                Physical Exam   Nursing note and vitals reviewed.  Constitutional: She is oriented to person, place, and time. She appears well-developed.   HENT:   Head: Normocephalic.   Eyes: Conjunctivae are normal.   Neck: Normal range of motion. No tracheal deviation present. No thyromegaly present.   Cardiovascular: Normal rate, normal heart sounds and normal pulses.    Pulmonary/Chest: Effort normal and breath sounds normal. No respiratory distress. She has no wheezes.   Abdominal: Soft. She exhibits no distension and no mass. There is no hepatosplenomegaly. There is no tenderness. There is no rebound, no guarding and no CVA tenderness. No hernia.   Musculoskeletal: Normal range of motion. She exhibits no edema or tenderness.   Lymphadenopathy:     She has no cervical adenopathy.   Neurological: She is alert and oriented to person, place, and time.   Skin: Skin is warm and dry. No rash noted. No erythema.     Psychiatric: She has a normal mood and affect. Her behavior is normal. Judgment and thought content normal.       Significant Labs:    BMP:    Recent Labs  Lab 05/01/17  1308 05/02/17  1510 05/03/17  0441   NA  --   --  140   K  --   --  4.7   CL  --   --  107   CO2  --   --   24   BUN  --   --  11   CREATININE 0.9 1.0 0.9   CALCIUM  --   --  9.0       CBC:     Recent Labs  Lab 05/03/17  0441   WBC 9.83   HGB 13.6   HCT 39.7          Blood Culture: No results for input(s): LABBLOO in the last 168 hours.  Urine Culture: No results for input(s): LABURIN in the last 168 hours.    Significant Imaging:

## 2017-05-03 NOTE — NURSING
Called Dr. Molina's office in attempt to inform him that no drain was placed. Was told to page overhead, still no contact with Dr. Molina. Called office and spoke with nurse, informed nurse about new findings. Nurse stated that Dr. Kowalski had already called to inform Dr. Molina, but the nurse would relay the message when she saw Dr. Molina.

## 2017-05-04 ENCOUNTER — TELEPHONE (OUTPATIENT)
Dept: UROLOGY | Facility: CLINIC | Age: 42
End: 2017-05-04

## 2017-05-04 PROBLEM — N83.209 OTHER AND UNSPECIFIED OVARIAN CYST: Status: ACTIVE | Noted: 2017-05-04

## 2017-05-04 NOTE — TELEPHONE ENCOUNTER
----- Message from Ruma Kowalski MD sent at 5/3/2017  6:19 PM CDT -----  She is scheduled to see Dr Shah on Tuesday next week. She can see me after her appt with Dr Shah (maybe around 10am) on Tuesday, rather than Monday. She drives from [place] so that will save her a trip.

## 2017-05-08 ENCOUNTER — OFFICE VISIT (OUTPATIENT)
Dept: UROLOGY | Facility: CLINIC | Age: 42
End: 2017-05-08
Payer: COMMERCIAL

## 2017-05-08 VITALS
BODY MASS INDEX: 29.51 KG/M2 | DIASTOLIC BLOOD PRESSURE: 76 MMHG | HEIGHT: 67 IN | RESPIRATION RATE: 14 BRPM | WEIGHT: 188 LBS | SYSTOLIC BLOOD PRESSURE: 104 MMHG | HEART RATE: 72 BPM

## 2017-05-08 DIAGNOSIS — R18.8 PELVIC FLUID COLLECTION: ICD-10-CM

## 2017-05-08 DIAGNOSIS — N39.46 MIXED INCONTINENCE URGE AND STRESS: Primary | ICD-10-CM

## 2017-05-08 LAB
BILIRUB SERPL-MCNC: NORMAL MG/DL
BLOOD URINE, POC: 250
COLOR, POC UA: NORMAL
GLUCOSE UR QL STRIP: NORMAL
KETONES UR QL STRIP: NORMAL
LEUKOCYTE ESTERASE URINE, POC: NORMAL
NITRITE, POC UA: NORMAL
PH, POC UA: 6
PROTEIN, POC: NORMAL
SPECIFIC GRAVITY, POC UA: 1000
UROBILINOGEN, POC UA: NORMAL

## 2017-05-08 PROCEDURE — 99999 PR PBB SHADOW E&M-EST. PATIENT-LVL III: CPT | Mod: PBBFAC,,, | Performed by: UROLOGY

## 2017-05-08 PROCEDURE — 99024 POSTOP FOLLOW-UP VISIT: CPT | Mod: S$GLB,,, | Performed by: UROLOGY

## 2017-05-08 PROCEDURE — 81002 URINALYSIS NONAUTO W/O SCOPE: CPT | Mod: S$GLB,,, | Performed by: UROLOGY

## 2017-05-08 RX ORDER — PHENAZOPYRIDINE HYDROCHLORIDE 200 MG/1
200 TABLET, FILM COATED ORAL 3 TIMES DAILY PRN
Qty: 45 TABLET | Refills: 3 | Status: SHIPPED | OUTPATIENT
Start: 2017-05-08 | End: 2017-05-18

## 2017-05-08 NOTE — MR AVS SNAPSHOT
[facility]  [address] [facility] [address]  [facility] [address]  Phone: [phone]                  Rad Fraser   2017 10:00 AM   Office Visit    Description:  Female : 1975   Provider:  Ruma Kowalski MD   Department:  [facility]           Reason for Visit     Post-op Evaluation     Other           Diagnoses this Visit        Comments    Mixed incontinence urge and stress    -  Primary            To Do List           Goals (5 Years of Data)     None      Follow-Up and Disposition     Return in about 4 weeks (around 2017) for Post-op.       These Medications        Disp Refills Start End    phenazopyridine (PYRIDIUM) 200 MG tablet 45 tablet 3 2017    Take 1 tablet (200 mg total) by mouth 3 (three) times daily as needed for Pain. - Oral    Pharmacy: [facility] [id number] [place] [address] [address] [facility] [address] [employer] [address] Ph #: [phone]         [facility]     [facility] Nurse Care Line -  Assistance  Unless otherwise directed by your provider, please contact [facility], our nurse care line that is available for  assistance.     Registered nurses in the [facility] provide: appointment scheduling, clinical advisement, health education, and other advisory services.  Call: [phone] (toll free)               Medications           Message regarding Medications     Verify the changes and/or additions to your medication regime listed below are the same as discussed with your clinician today.  If any of these changes or additions are incorrect, please notify your healthcare provider.        START taking these NEW medications        Refills    phenazopyridine (PYRIDIUM) 200 MG tablet 3    Sig: Take 1 tablet (200 mg total) by mouth 3 (three) times daily as needed for Pain.    Class: Normal    Route: Oral           Verify that the below list of medications is an accurate representation of the  "medications you are currently taking.  If none reported, the list may be blank. If incorrect, please contact your healthcare provider. Carry this list with you in case of emergency.           Current Medications     albuterol (PROAIR HFA) 90 mcg/actuation inhaler Inhale 2 puffs into the lungs every 6 (six) hours as needed for Wheezing. Rescue    ascorbic acid, vitamin C, (VITAMIN C) 1000 MG tablet Take 1,000 mg by mouth once daily.    biotin 2,500 mcg Tab Take 5,000 mcg by mouth once daily.    cephALEXin (KEFLEX) 500 MG capsule Take 1 capsule (500 mg total) by mouth every 8 (eight) hours.    desvenlafaxine succinate (PRISTIQ) 100 MG Tb24 Take 100 mg by mouth once daily.    docusate sodium (COLACE) 100 MG capsule Take 1 capsule (100 mg total) by mouth 2 (two) times daily.    oxybutynin (DITROPAN) 5 MG Tab Take 1 tablet (5 mg total) by mouth 3 (three) times daily as needed (bladder spasms).    oxycodone-acetaminophen (PERCOCET) 5-325 mg per tablet Take 1 tablet by mouth every 4 (four) hours as needed for Pain.    oxycodone-acetaminophen (PERCOCET) 5-325 mg per tablet Take 1 tablet by mouth every 4 (four) hours as needed.    PV W-O RAAD/FERROUS FUMARATE/FA (M-VIT ORAL) Take 1 tablet by mouth once daily.    zolpidem (AMBIEN) 10 mg Tab Take 1 tablet (10 mg total) by mouth nightly as needed.    phenazopyridine (PYRIDIUM) 200 MG tablet Take 1 tablet (200 mg total) by mouth 3 (three) times daily as needed for Pain.           Clinical Reference Information           Your Vitals Were     BP Pulse Resp Height Weight Last Period    104/76 72 14 5' 7" (1.702 m) 85.3 kg (188 lb) 04/16/2017    BMI                29.44 kg/m2          Blood Pressure          Most Recent Value    BP  104/76      Allergies as of 5/8/2017     No Known Allergies      Immunizations Administered on Date of Encounter - 5/8/2017     None      Orders Placed During Today's Visit      Normal Orders This Visit    POCT URINE DIPSTICK WITHOUT MICROSCOPE     "   Language Assistance Services     ATTENTION: Language assistance services are available, free of charge. Please call [phone].      ATENCIÓN: Si habla jeffery, tiene a beltran disposición servicios gratuitos de asistencia lingüística. Llame al [phone].     CHÚ Ý: N?u b?n nói Ti?ng Vi?t, có các d?ch v? h? tr? ngôn ng? mi?n phí dành cho b?n. G?i s? [phone].         [facility] complies with applicable Federal civil rights laws and does not discriminate on the basis of race, color, national origin, age, disability, or sex.

## 2017-05-14 RX ORDER — OXYBUTYNIN CHLORIDE 5 MG/1
TABLET ORAL
Qty: 30 TABLET | Refills: 0 | Status: SHIPPED | OUTPATIENT
Start: 2017-05-14 | End: 2017-09-11

## 2017-05-19 ENCOUNTER — PATIENT MESSAGE (OUTPATIENT)
Dept: CASE MANAGEMENT | Facility: HOSPITAL | Age: 42
End: 2017-05-19

## 2017-05-22 ENCOUNTER — TELEPHONE (OUTPATIENT)
Dept: UROLOGY | Facility: CLINIC | Age: 42
End: 2017-05-22

## 2017-05-22 ENCOUNTER — PATIENT MESSAGE (OUTPATIENT)
Dept: UROLOGY | Facility: CLINIC | Age: 42
End: 2017-05-22

## 2017-05-22 DIAGNOSIS — N39.3 STRESS INCONTINENCE, FEMALE: Primary | ICD-10-CM

## 2017-05-22 NOTE — TELEPHONE ENCOUNTER
Pt is scheduled to see me 6/5/17. Please change that to cystoscopy with stent removal in office. Order placed.     Thanks!

## 2017-05-23 RX ORDER — DOCUSATE SODIUM 100 MG/1
CAPSULE, LIQUID FILLED ORAL
Qty: 30 CAPSULE | Refills: 0 | Status: SHIPPED | OUTPATIENT
Start: 2017-05-23 | End: 2017-09-11

## 2017-05-28 ENCOUNTER — PATIENT MESSAGE (OUTPATIENT)
Dept: FAMILY MEDICINE | Facility: CLINIC | Age: 42
End: 2017-05-28

## 2017-05-28 DIAGNOSIS — F41.8 MIXED ANXIETY AND DEPRESSIVE DISORDER: ICD-10-CM

## 2017-05-29 PROBLEM — R19.03 ABDOMINAL OR PELVIC SWELLING, MASS, OR LUMP, RIGHT LOWER QUADRANT: Status: ACTIVE | Noted: 2017-05-29

## 2017-05-29 RX ORDER — DESVENLAFAXINE 100 MG/1
100 TABLET, EXTENDED RELEASE ORAL DAILY
Qty: 30 TABLET | Refills: 2 | Status: SHIPPED | OUTPATIENT
Start: 2017-05-29 | End: 2017-09-11

## 2017-05-30 ENCOUNTER — PATIENT MESSAGE (OUTPATIENT)
Dept: FAMILY MEDICINE | Facility: CLINIC | Age: 42
End: 2017-05-30

## 2017-06-05 ENCOUNTER — OFFICE VISIT (OUTPATIENT)
Dept: UROLOGY | Facility: CLINIC | Age: 42
End: 2017-06-05
Payer: COMMERCIAL

## 2017-06-05 DIAGNOSIS — N39.3 STRESS INCONTINENCE, FEMALE: ICD-10-CM

## 2017-06-05 PROCEDURE — 99999 PR PBB SHADOW E&M-EST. PATIENT-LVL II: CPT | Mod: PBBFAC,,, | Performed by: UROLOGY

## 2017-06-05 PROCEDURE — 99024 POSTOP FOLLOW-UP VISIT: CPT | Mod: S$GLB,,, | Performed by: UROLOGY

## 2017-06-05 PROCEDURE — 52310 CYSTOSCOPY AND TREATMENT: CPT | Mod: 58,S$GLB,, | Performed by: UROLOGY

## 2017-06-05 NOTE — PROGRESS NOTES
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    Surgeon: Ruma Kowalski MD    Stent placed due to concern for R ureteral injury after MUS - no injury confirmed. She is now s/o oophorectomy. Patient desires stent removal. The risks, benefits, and alternatives were discussed with the patient.    Description:  Patient was identified by name and number. Full time out procedures were performed. Lidocaine jelly placed urethrally. Perineum and groin were prepped and draped in the usual sterile manner.    A 16Fr flexible cystoscope was inserted through the urethra into the bladder and the stent was seen protruding from the R ureteral orifice. A visual exam revealed no tumors or areas of suspicion. The stent was grasped with cystoscopic graspers and removed. The stent was examined and noted to be present in its entirety. The patient tolerated the procedure. Cleaned and dressed and instructions given.     Post-op Instructions/Follow-up  1. Notify MD if renal colic, fevers, chills, or symptoms recur.  2. RTC 3 months.    ______________________  Ruma Kowalski MD

## 2017-07-17 ENCOUNTER — PATIENT MESSAGE (OUTPATIENT)
Dept: UROLOGY | Facility: CLINIC | Age: 42
End: 2017-07-17

## 2017-07-17 ENCOUNTER — LAB VISIT (OUTPATIENT)
Dept: LAB | Facility: HOSPITAL | Age: 42
End: 2017-07-17
Attending: UROLOGY
Payer: COMMERCIAL

## 2017-07-17 DIAGNOSIS — R30.0 DYSURIA: Primary | ICD-10-CM

## 2017-07-17 DIAGNOSIS — R30.0 DYSURIA: ICD-10-CM

## 2017-07-17 PROCEDURE — 87086 URINE CULTURE/COLONY COUNT: CPT

## 2017-07-17 PROCEDURE — 87186 SC STD MICRODIL/AGAR DIL: CPT

## 2017-07-17 PROCEDURE — 87077 CULTURE AEROBIC IDENTIFY: CPT

## 2017-07-17 PROCEDURE — 87088 URINE BACTERIA CULTURE: CPT

## 2017-07-17 NOTE — TELEPHONE ENCOUNTER
Ordered UA and UCx. This can be done at any [facility] facility (she lives on [facility]).

## 2017-07-19 ENCOUNTER — TELEPHONE (OUTPATIENT)
Dept: UROLOGY | Facility: CLINIC | Age: 42
End: 2017-07-19

## 2017-07-19 LAB — BACTERIA UR CULT: NORMAL

## 2017-07-19 RX ORDER — SULFAMETHOXAZOLE AND TRIMETHOPRIM 800; 160 MG/1; MG/1
1 TABLET ORAL 2 TIMES DAILY
Qty: 14 TABLET | Refills: 0 | Status: SHIPPED | OUTPATIENT
Start: 2017-07-19 | End: 2017-07-26

## 2017-07-25 ENCOUNTER — OFFICE VISIT (OUTPATIENT)
Dept: URGENT CARE | Facility: CLINIC | Age: 42
End: 2017-07-25
Payer: COMMERCIAL

## 2017-07-25 VITALS
OXYGEN SATURATION: 100 % | DIASTOLIC BLOOD PRESSURE: 86 MMHG | BODY MASS INDEX: 28.56 KG/M2 | RESPIRATION RATE: 18 BRPM | TEMPERATURE: 97 F | HEIGHT: 67 IN | WEIGHT: 182 LBS | SYSTOLIC BLOOD PRESSURE: 123 MMHG | HEART RATE: 81 BPM

## 2017-07-25 DIAGNOSIS — R51.9 HEADACHE, UNSPECIFIED HEADACHE TYPE: ICD-10-CM

## 2017-07-25 DIAGNOSIS — R21 RASH AND NONSPECIFIC SKIN ERUPTION: Primary | ICD-10-CM

## 2017-07-25 PROCEDURE — 99214 OFFICE O/P EST MOD 30 MIN: CPT | Mod: 25,S$GLB,, | Performed by: FAMILY MEDICINE

## 2017-07-25 PROCEDURE — 96372 THER/PROPH/DIAG INJ SC/IM: CPT | Mod: S$GLB,,, | Performed by: FAMILY MEDICINE

## 2017-07-25 RX ORDER — BETAMETHASONE SODIUM PHOSPHATE AND BETAMETHASONE ACETATE 3; 3 MG/ML; MG/ML
6 INJECTION, SUSPENSION INTRA-ARTICULAR; INTRALESIONAL; INTRAMUSCULAR; SOFT TISSUE ONCE
Status: COMPLETED | OUTPATIENT
Start: 2017-07-25 | End: 2017-07-25

## 2017-07-25 RX ADMIN — BETAMETHASONE SODIUM PHOSPHATE AND BETAMETHASONE ACETATE 6 MG: 3; 3 INJECTION, SUSPENSION INTRA-ARTICULAR; INTRALESIONAL; INTRAMUSCULAR; SOFT TISSUE at 02:07

## 2017-07-25 NOTE — PATIENT INSTRUCTIONS
"Allergic reaction/contact dermatitis      You are having an allergic reaction. This may cause an itchy rash, dizziness, fainting, trouble breathing or swallowing, and swelling of the face or other parts of the body.    This can be caused by exposure to something in your surroundings that you have become sensitive to. This could be due to medicine or food. This could also be due to something you put on your skin or in your hair or something in the air. Often it is not possible to find out exactly what has caused your reaction.    The goal of today's treatment is to relieve symptoms. The rash will usually fade over several days, but can sometimes last up to two weeks.    HOME CARE:    1) If you know what you are allergic to, avoid it because future reactions could be worse than this one.    2) Avoid tight clothing and anything that heats up your skin (hot showers/baths, direct sunlight) since heat will make itching worse.    3) An ice pack will relieve local areas of intense itching and redness. Lanacaine cream or Solarcaine spray (or other product containing "benzocaine", available without a prescription) will reduce the itching.    4) Oral Benadryl (diphenhydramine) is an antihistamine available at drug and grocery stores. Unless a prescription antihistamine was given, Benadryl may be used to reduce itching if large areas of the skin are involved. Use lower doses during the daytime and higher doses at bedtime since the drug may make you sleepy. [NOTE: Do not use Benadryl if you have glaucoma or if you are a man with trouble urinating due to an enlarged prostate.] Claritin (loratidine) or Zyrtec are antihistamine that causes less drowsiness and is a good alternative for daytime use.   5) Zantac or Pepcid daily      FOLLOW UP with your doctor or this facility in two days if your symptoms do not continue to improve. If you had a severe reaction today, or if you have had several mild-moderate allergic reactions in the " past, ask your doctor about allergy testing to find out what you are allergic to. If your reaction included dizziness, fainting or trouble breathing or swallowing, ask your doctor about carrying an Allergy Kit (injectable epinephrine) for home use.    GET PROMPT MEDICAL ATTENTION if any of the following occur:    -- Trouble breathing or swallowing    -- New or worse swelling in the face, eyelids, lips, mouth, tongue or throat    -- Dizziness, weakness or fainting

## 2017-07-25 NOTE — PROGRESS NOTES
"Subjective:       Patient ID: Rad Fraser is a 41 y.o. female.    Vitals:  height is 5' 7" (1.702 m) and weight is 82.6 kg (182 lb). Her temperature is 97 °F (36.1 °C). Her blood pressure is 123/86 and her pulse is 81. Her respiration is 18 and oxygen saturation is 100%.     Chief Complaint: Rash    Rash spreading from chest to back to bilat arms. Started Flax seed oil and bactrim last week. No new soap/detergent. Pt did express concern about mosquitos and [place]      Rash   This is a new problem. The current episode started today. The problem has been gradually worsening since onset. The affected locations include the chest, neck, back, left arm and right arm. The rash is characterized by redness and dryness. Associated symptoms include fatigue and joint pain. Pertinent negatives include no fever, shortness of breath or sore throat.     Review of Systems   Constitution: Positive for fatigue and night sweats. Negative for chills and fever.   HENT: Positive for headaches. Negative for sore throat.    Respiratory: Negative for shortness of breath.    Skin: Positive for dry skin and rash. Negative for itching.   Musculoskeletal: Positive for joint pain and muscle weakness.       Objective:      Physical Exam   Constitutional: She is oriented to person, place, and time. She appears well-developed and well-nourished.   HENT:   Head: Normocephalic and atraumatic. Head is without abrasion, without contusion and without laceration.   Right Ear: External ear normal.   Left Ear: External ear normal.   Nose: Nose normal.   Mouth/Throat: Oropharynx is clear and moist.   Eyes: Conjunctivae, EOM and lids are normal. Pupils are equal, round, and reactive to light.   Neck: Trachea normal, full passive range of motion without pain and phonation normal. Neck supple.   Cardiovascular: Normal rate, regular rhythm and normal heart sounds.    Pulmonary/Chest: Effort normal and breath sounds normal. No stridor. No respiratory " distress.   Neurological: She is alert and oriented to person, place, and time.   Skin: Skin is warm, dry and intact. Capillary refill takes less than 2 seconds. Rash (generalized trunk upper thigh. face sparing) noted. No abrasion, no bruising, no burn, no ecchymosis, no laceration and no lesion noted. Rash is maculopapular. Rash is not nodular, not pustular, not vesicular and not urticarial. No erythema.   Psychiatric: She has a normal mood and affect. Her speech is normal and behavior is normal. Judgment and thought content normal. Cognition and memory are normal.   Nursing note and vitals reviewed.      Assessment:       1. Rash and nonspecific skin eruption    2. Headache, unspecified headache type        Plan:         Rash and nonspecific skin eruption    Headache, unspecified headache type  -     [place] ANTIBODIES, IGG AND IGM; Future; Expected date: 07/25/2017    Other orders  -     betamethasone acetate-betamethasone sodium phosphate injection 6 mg; Inject 1 mL (6 mg total) into the muscle once.

## 2017-07-29 LAB
WNV IGG SER IA-ACNC: <1.3 INDEX
WNV IGM SER IA-ACNC: <0.9 INDEX

## 2017-08-02 ENCOUNTER — TELEPHONE (OUTPATIENT)
Dept: UROLOGY | Facility: CLINIC | Age: 42
End: 2017-08-02

## 2017-08-02 RX ORDER — NITROFURANTOIN 25; 75 MG/1; MG/1
100 CAPSULE ORAL 2 TIMES DAILY
Qty: 20 CAPSULE | Refills: 0 | Status: SHIPPED | OUTPATIENT
Start: 2017-08-02 | End: 2017-08-12

## 2017-08-02 NOTE — TELEPHONE ENCOUNTER
----- Message from Barbara Jalil sent at 8/2/2017  4:01 PM CDT -----  Contact: Self  X   1st Request  _  2nd Request  _  3rd Request        Who:  ROSA M SCHROEDER [[id number]]    Why: Pt states she would like to speak to the clinical team regarding the most recent antibiotics that she was prescribed. Pt would also like to discuss her ongoing symptoms. Please call, thanks!    What Number to Call Back: [phone]    When to Expect a call back: (With in 24 hours)

## 2017-08-02 NOTE — TELEPHONE ENCOUNTER
Pt states was put on bactrim for a uti but now is allergic to medication broke out is a severe rash and had to go to er for this.  She was on bactrim for 5 days an advised by er doctor to stop. Pt is starting to have symptoms of burning an urinary frequency she is asking should she do another urine culture or can  call something else in for her. Please advise jordan

## 2017-08-03 ENCOUNTER — TELEPHONE (OUTPATIENT)
Dept: UROLOGY | Facility: CLINIC | Age: 42
End: 2017-08-03

## 2017-08-03 NOTE — TELEPHONE ENCOUNTER
Spoke to pt advised rx for macrobid called into pharmacy if symptoms doesn't get better will need to bring a urine culture./jordan

## 2017-09-11 ENCOUNTER — OFFICE VISIT (OUTPATIENT)
Dept: UROLOGY | Facility: CLINIC | Age: 42
End: 2017-09-11
Payer: COMMERCIAL

## 2017-09-11 VITALS
BODY MASS INDEX: 28.58 KG/M2 | DIASTOLIC BLOOD PRESSURE: 70 MMHG | HEIGHT: 67 IN | WEIGHT: 182.13 LBS | HEART RATE: 62 BPM | SYSTOLIC BLOOD PRESSURE: 110 MMHG

## 2017-09-11 DIAGNOSIS — N83.209 OVARIAN CYST: ICD-10-CM

## 2017-09-11 DIAGNOSIS — N39.46 MIXED INCONTINENCE URGE AND STRESS: Primary | ICD-10-CM

## 2017-09-11 PROBLEM — R19.8 SUPRAPUBIC FULLNESS: Status: RESOLVED | Noted: 2017-05-02 | Resolved: 2017-09-11

## 2017-09-11 PROBLEM — R19.03 ABDOMINAL OR PELVIC SWELLING, MASS, OR LUMP, RIGHT LOWER QUADRANT: Status: RESOLVED | Noted: 2017-05-29 | Resolved: 2017-09-11

## 2017-09-11 PROCEDURE — 99214 OFFICE O/P EST MOD 30 MIN: CPT | Mod: 25,S$GLB,, | Performed by: UROLOGY

## 2017-09-11 PROCEDURE — 99999 PR PBB SHADOW E&M-EST. PATIENT-LVL III: CPT | Mod: PBBFAC,,, | Performed by: UROLOGY

## 2017-09-11 PROCEDURE — 3008F BODY MASS INDEX DOCD: CPT | Mod: S$GLB,,, | Performed by: UROLOGY

## 2017-09-11 PROCEDURE — 51701 INSERT BLADDER CATHETER: CPT | Mod: S$GLB,,, | Performed by: UROLOGY

## 2017-09-11 RX ORDER — TRAZODONE HYDROCHLORIDE 150 MG/1
200 TABLET ORAL NIGHTLY
COMMUNITY
End: 2019-09-05

## 2017-09-11 RX ORDER — BUPROPION HYDROCHLORIDE 150 MG/1
150 TABLET ORAL DAILY
COMMUNITY
End: 2021-10-13

## 2017-09-11 RX ORDER — AMOXICILLIN 500 MG
2 CAPSULE ORAL DAILY
Status: ON HOLD | COMMUNITY
End: 2022-10-11 | Stop reason: HOSPADM

## 2017-09-11 NOTE — PROGRESS NOTES
Subjective:       Rad Fraser is a 41 y.o. female who is an established patient who was referred by Dr Osorio for evaluation of BOY.      Urinary Incontinence  Patient complains of urinary incontinence. This has been present for several years but worsening for several months. She leaks urine with coughing, laughing, sneezing, exercise, with urge. Patient describes the symptoms as nocturia 3 times per night, sensation of incomplete emptying of bladder and urine leakage with coughing/heavy physical activity. Factors associated with symptoms include none known. Evaluation to date includes none. Treatment to date includes Kegel exercises, which was not very effective. UUI less of an issue.     Cysto/UDS 2/7/17:  Findings of the Procedure: Stable filling. No IBC/DO. ++BOY noted with cough. Large capacity bladder with some hesitancy likely due to testing situation. Good bladder contraction with normal flow. Complete emptying.  Recommendations: Stress urinary incontinence - recommend MUS (other options PFPT, pessary, bulking agent).    She is now s/p RP-MUS on 4/21/17. She reports no issues with leakage but is having slow stream.     PVR (bladder scan) - >691cc, void then >912cc. This was found to be a large fluid collection, outside of the bladder. Cr was serum. Cytology negative. R URS normal. R stent placed and subsequently removed. She is s/p oophorectomy for benign tumor.     She has been doing well. No further BOY. UUI x 1 at time of bladder infection - now resolved. She does note some change in positioning to empty bladder completely.         The following portions of the patient's history were reviewed and updated as appropriate: allergies, current medications, past family history, past medical history, past social history, past surgical history and problem list.    Review of Systems  Constitutional: no fever or chills  ENT: no nasal congestion or sore throat  Respiratory: no cough or shortness of  "breath  Cardiovascular: no chest pain or palpitations  Gastrointestinal: no nausea or vomiting, tolerating diet  Genitourinary: as per HPI  Hematologic/Lymphatic: no easy bruising or lymphadenopathy  Musculoskeletal: no arthralgias or myalgias  Skin: no rashes or lesions  Neurological: no seizures or tremors  Behavioral/Psych: no auditory or visual hallucinations       Objective:    Vitals:   /70   Pulse 62   Ht 5' 7" (1.702 m)   Wt 82.6 kg (182 lb 1.6 oz)   BMI 28.52 kg/m²     Physical Exam   General: well developed, well nourished in no acute distress  Head: normocephalic, atraumatic  Neck: supple, trachea midline, no obvious enlargement of thyroid  HEENT: EOMI, mucus membranes moist, sclera anicteric, no hearing impairment  Lungs: symmetric expansion, non-labored breathing  Cardiovascular: regular rate and rhythm, normal pulses  Abdomen: soft, non tender, non distended, no palpable masses, no hepatosplenomegaly, no hernias, no CVA tenderness  Musculoskeletal: no peripheral edema, normal ROM in bilateral upper and lower extremities  Lymphatics: no cervical or inguinal lymphadenopathy  Skin: no rashes or lesions  Neuro: alert and oriented x 3, no gross deficits  Psych: normal judgment and insight, normal mood/affect and non-anxious  Genitourinary:   Breast - deferred. External genitalia - no masses or lesions, normal hair distribution.  Urethral meatus - orthotopic location without lesion or prolapse.  Urethra - no lesions palpated, non tender, no discharge, no diverticula noted.  Bladder - palpable distention in SP area. Incision well healed. No BOY. 15cc PVR with I&O catheter.     Lab Review   Urine analysis today in clinic shows - negative    Lab Results   Component Value Date    WBC 4.96 07/26/2017    HGB 15.2 07/26/2017    HCT 44.3 07/26/2017    MCV 91 07/26/2017     07/26/2017     Lab Results   Component Value Date    CREATININE 0.61 05/29/2017    BUN 10 05/29/2017         Imaging  Images and " reports were personally reviewed by me and discussed with patient    Cystoscopy:  No bladder or urethral injury noted. Clear efflux from L ureter. No efflux noted from R.    CT uro:  Large pelvic fluid collection.       Assessment/Plan:      1. Mixed incontinence urge and stress    - Discussed difference of UUI and BOY components. Reviewed etiology and workup of each.   - BOY: Kegels, PFPT, bulking agent, MUS.   - UUI: Behavioral changes, PFPT, anticholinergics. Botox/InterStim for refractory UUI.   - BOY much more bothersome. UUI rare.   - Cysto/UDS 1/31/17 - BOY confirmed   - s/p RP-MUS 4/21/17   - Suspected UR 2/2 high bladder scan PVR - attempted CIC teaching but minimal UOP. 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    - Cystoscopy, URS negative for bladder or ureteral injury - stent removed in clinic   - CT uro - large cystic ovarian mass      - Now doing well - no further BOY. Emptying well.       2. Pelvic fluid collection   - Benign ovarian tumor s/p resection.   - Cytology negative.  normal.   - R stent removed    Follow up in 6 months with PVR

## 2018-03-12 ENCOUNTER — OFFICE VISIT (OUTPATIENT)
Dept: UROLOGY | Facility: CLINIC | Age: 43
End: 2018-03-12
Payer: COMMERCIAL

## 2018-03-12 VITALS
HEART RATE: 68 BPM | SYSTOLIC BLOOD PRESSURE: 112 MMHG | WEIGHT: 170 LBS | BODY MASS INDEX: 26.68 KG/M2 | RESPIRATION RATE: 14 BRPM | HEIGHT: 67 IN | DIASTOLIC BLOOD PRESSURE: 60 MMHG

## 2018-03-12 DIAGNOSIS — N39.3 STRESS INCONTINENCE, FEMALE: Primary | ICD-10-CM

## 2018-03-12 DIAGNOSIS — K59.00 CONSTIPATION, UNSPECIFIED CONSTIPATION TYPE: ICD-10-CM

## 2018-03-12 PROCEDURE — 99999 PR PBB SHADOW E&M-EST. PATIENT-LVL III: CPT | Mod: PBBFAC,,, | Performed by: UROLOGY

## 2018-03-12 PROCEDURE — 99213 OFFICE O/P EST LOW 20 MIN: CPT | Mod: S$GLB,,, | Performed by: UROLOGY

## 2018-03-12 RX ORDER — FLUTICASONE PROPIONATE 50 UG/1
SPRAY, METERED NASAL
Refills: 5 | COMMUNITY
Start: 2018-01-10

## 2018-03-12 RX ORDER — TRAZODONE HYDROCHLORIDE 50 MG/1
TABLET ORAL
Refills: 2 | COMMUNITY
Start: 2018-02-02 | End: 2018-03-12

## 2018-03-12 NOTE — PROGRESS NOTES
Subjective:       Rad Fraser is a 42 y.o. female who is an established patient who was referred by Dr Osorio for evaluation of BOY.      Urinary Incontinence  Patient complains of urinary incontinence. This has been present for several years but worsening for several months. She leaks urine with coughing, laughing, sneezing, exercise, with urge. Patient describes the symptoms as nocturia 3 times per night, sensation of incomplete emptying of bladder and urine leakage with coughing/heavy physical activity. Factors associated with symptoms include none known. Evaluation to date includes none. Treatment to date includes Kegel exercises, which was not very effective. UUI less of an issue.     Cysto/UDS 2/7/17:  Findings of the Procedure: Stable filling. No IBC/DO. ++BOY noted with cough. Large capacity bladder with some hesitancy likely due to testing situation. Good bladder contraction with normal flow. Complete emptying.  Recommendations: Stress urinary incontinence - recommend MUS (other options PFPT, pessary, bulking agent).    She is now s/p RP-MUS on 4/21/17. She reports no issues with leakage but is having slow stream.     PVR (bladder scan) - >691cc, void then >912cc. This was found to be a large fluid collection, outside of the bladder. Cr was serum. Cytology negative. R URS normal. R stent placed and subsequently removed. She is s/p oophorectomy for benign tumor.     She has been doing well. No further BOY. UUI x 1 at time of bladder infection - now resolved. She does note some change in positioning to empty bladder completely.     Feeling well again today - no BOY. +hemorrhoid issues related to constipation.    PVR (bladder scan) today - 34cc         The following portions of the patient's history were reviewed and updated as appropriate: allergies, current medications, past family history, past medical history, past social history, past surgical history and problem list.    Review of  "Systems  Constitutional: no fever or chills  ENT: no nasal congestion or sore throat  Respiratory: no cough or shortness of breath  Cardiovascular: no chest pain or palpitations  Gastrointestinal: no nausea or vomiting, tolerating diet  Genitourinary: as per HPI  Hematologic/Lymphatic: no easy bruising or lymphadenopathy  Musculoskeletal: no arthralgias or myalgias  Skin: no rashes or lesions  Neurological: no seizures or tremors  Behavioral/Psych: no auditory or visual hallucinations       Objective:    Vitals:   /60   Pulse 68   Resp 14   Ht 5' 7" (1.702 m)   Wt 77.1 kg (170 lb)   BMI 26.63 kg/m²     Physical Exam   General: well developed, well nourished in no acute distress  Head: normocephalic, atraumatic  Neck: supple, trachea midline, no obvious enlargement of thyroid  HEENT: EOMI, mucus membranes moist, sclera anicteric, no hearing impairment  Lungs: symmetric expansion, non-labored breathing  Cardiovascular: regular rate and rhythm, normal pulses  Abdomen: soft, non tender, non distended, no palpable masses, no hepatosplenomegaly, no hernias, no CVA tenderness  Musculoskeletal: no peripheral edema, normal ROM in bilateral upper and lower extremities  Lymphatics: no cervical or inguinal lymphadenopathy  Skin: no rashes or lesions  Neuro: alert and oriented x 3, no gross deficits  Psych: normal judgment and insight, normal mood/affect and non-anxious  Genitourinary:   Breast - deferred. External genitalia - no masses or lesions, normal hair distribution.  Urethral meatus - orthotopic location without lesion or prolapse.  Urethra - no lesions palpated, non tender, no discharge, no diverticula noted.  Bladder - palpable distention in SP area. Incision well healed. No BOY. 15cc PVR with I&O catheter.     Lab Review   Urine analysis today in clinic shows - negative    Lab Results   Component Value Date    WBC 4.96 07/26/2017    HGB 15.2 07/26/2017    HCT 44.3 07/26/2017    MCV 91 07/26/2017     " 07/26/2017     Lab Results   Component Value Date    CREATININE 0.61 05/29/2017    BUN 10 05/29/2017         Imaging  Images and reports were personally reviewed by me and discussed with patient    Cystoscopy:  No bladder or urethral injury noted. Clear efflux from L ureter. No efflux noted from R.    CT uro:  Large pelvic fluid collection.       Assessment/Plan:      1. Mixed incontinence urge and stress    - Discussed difference of UUI and BOY components. Reviewed etiology and workup of each.   - BOY: Kegels, PFPT, bulking agent, MUS.   - UUI: Behavioral changes, PFPT, anticholinergics. Botox/InterStim for refractory UUI.   - BOY much more bothersome. UUI rare.   - Cysto/UDS 1/31/17 - BOY confirmed   - s/p RP-MUS 4/21/17   - Suspected UR 2/2 high bladder scan PVR - attempted CIC teaching but minimal UOP. 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    - Cystoscopy, URS negative for bladder or ureteral injury - stent removed in clinic   - CT uro - large cystic ovarian mass      - Now doing well - no further BOY. Emptying well.       2. Pelvic fluid collection   - Benign ovarian tumor s/p resection.   - Cytology negative.  normal.   - R stent removed    Follow up in 12 months with PVR

## 2018-11-07 ENCOUNTER — PATIENT MESSAGE (OUTPATIENT)
Dept: UROLOGY | Facility: CLINIC | Age: 43
End: 2018-11-07

## 2018-11-08 ENCOUNTER — PATIENT MESSAGE (OUTPATIENT)
Dept: UROLOGY | Facility: CLINIC | Age: 43
End: 2018-11-08

## 2018-11-15 ENCOUNTER — PATIENT MESSAGE (OUTPATIENT)
Dept: UROLOGY | Facility: CLINIC | Age: 43
End: 2018-11-15

## 2019-01-06 ENCOUNTER — PATIENT MESSAGE (OUTPATIENT)
Dept: UROLOGY | Facility: CLINIC | Age: 44
End: 2019-01-06

## 2019-01-06 DIAGNOSIS — N39.0 RECURRENT UTI: Primary | ICD-10-CM

## 2019-01-07 ENCOUNTER — PATIENT MESSAGE (OUTPATIENT)
Dept: UROLOGY | Facility: CLINIC | Age: 44
End: 2019-01-07

## 2019-02-05 ENCOUNTER — PROCEDURE VISIT (OUTPATIENT)
Dept: UROLOGY | Facility: CLINIC | Age: 44
End: 2019-02-05
Payer: COMMERCIAL

## 2019-02-05 VITALS — RESPIRATION RATE: 16 BRPM | HEIGHT: 67 IN | WEIGHT: 169 LBS | BODY MASS INDEX: 26.53 KG/M2

## 2019-02-05 DIAGNOSIS — N39.0 RECURRENT UTI: ICD-10-CM

## 2019-02-05 PROCEDURE — 52000 CYSTOURETHROSCOPY: CPT | Mod: S$GLB,,, | Performed by: UROLOGY

## 2019-02-05 PROCEDURE — 52000 CYSTOSCOPY: ICD-10-PCS | Mod: S$GLB,,, | Performed by: UROLOGY

## 2019-02-05 NOTE — PROCEDURES
"Cystoscopy  Date/Time: 2/5/2019 9:49 AM  Performed by: Ruma Kowalski MD  Authorized by: Ruma Kowalski MD     Consent Done?:  Yes (Written)  Time out: Immediately prior to procedure a "time out" was called to verify the correct patient, procedure, equipment, support staff and site/side marked as required.    Indications: recurrent UTIs    Position:  Dorsal lithotomy  Anesthesia:  Lidocaine jelly  Patient sedated?: No    Preparation: Patient was prepped and draped in usual sterile fashion      Scope type:  Flexible cystoscope  Stent inserted: No    Stent removed: No    External exam normal: Yes    Digital exam performed: Yes    Urethra normal: Yes  Bladder neck normal: Bladder neck normal   Bladder normal: Yes      Patient tolerance:  Patient tolerated the procedure well with no immediate complications     Normal cystoscopy. No mesh erosion/exposure.   Recommend D-mannose/cranberry and/or probiotics for now.      "

## 2019-09-05 ENCOUNTER — OFFICE VISIT (OUTPATIENT)
Dept: UROLOGY | Facility: CLINIC | Age: 44
End: 2019-09-05
Payer: COMMERCIAL

## 2019-09-05 VITALS — RESPIRATION RATE: 16 BRPM | BODY MASS INDEX: 26.37 KG/M2 | WEIGHT: 168 LBS | HEIGHT: 67 IN

## 2019-09-05 DIAGNOSIS — N39.46 MIXED INCONTINENCE URGE AND STRESS: ICD-10-CM

## 2019-09-05 DIAGNOSIS — N39.0 RECURRENT UTI: Primary | ICD-10-CM

## 2019-09-05 DIAGNOSIS — N39.3 STRESS INCONTINENCE, FEMALE: ICD-10-CM

## 2019-09-05 PROCEDURE — 99999 PR PBB SHADOW E&M-EST. PATIENT-LVL III: ICD-10-PCS | Mod: PBBFAC,,, | Performed by: UROLOGY

## 2019-09-05 PROCEDURE — 99214 PR OFFICE/OUTPT VISIT, EST, LEVL IV, 30-39 MIN: ICD-10-PCS | Mod: S$GLB,,, | Performed by: UROLOGY

## 2019-09-05 PROCEDURE — 99214 OFFICE O/P EST MOD 30 MIN: CPT | Mod: S$GLB,,, | Performed by: UROLOGY

## 2019-09-05 PROCEDURE — 3008F BODY MASS INDEX DOCD: CPT | Mod: CPTII,S$GLB,, | Performed by: UROLOGY

## 2019-09-05 PROCEDURE — 99999 PR PBB SHADOW E&M-EST. PATIENT-LVL III: CPT | Mod: PBBFAC,,, | Performed by: UROLOGY

## 2019-09-05 PROCEDURE — 3008F PR BODY MASS INDEX (BMI) DOCUMENTED: ICD-10-PCS | Mod: CPTII,S$GLB,, | Performed by: UROLOGY

## 2019-09-05 RX ORDER — TEMAZEPAM 30 MG/1
CAPSULE ORAL
Refills: 0 | Status: ON HOLD | COMMUNITY
Start: 2019-08-20 | End: 2022-10-11 | Stop reason: HOSPADM

## 2019-09-05 RX ORDER — SERTRALINE HYDROCHLORIDE 50 MG/1
TABLET, FILM COATED ORAL
Refills: 1 | COMMUNITY
Start: 2019-08-05 | End: 2021-10-13

## 2019-09-05 NOTE — PROGRESS NOTES
Subjective:       Rad Fraser is a 43 y.o. female who is an established patient who was referred by Dr Osorio for evaluation of BOY.      Urinary Incontinence  Patient complains of urinary incontinence. This has been present for several years but worsening for several months. She leaks urine with coughing, laughing, sneezing, exercise, with urge. Patient describes the symptoms as nocturia 3 times per night, sensation of incomplete emptying of bladder and urine leakage with coughing/heavy physical activity. Factors associated with symptoms include none known. Evaluation to date includes none. Treatment to date includes Kegel exercises, which was not very effective. UUI less of an issue.     Cysto/UDS 2/7/17:  Findings of the Procedure: Stable filling. No IBC/DO. ++BOY noted with cough. Large capacity bladder with some hesitancy likely due to testing situation. Good bladder contraction with normal flow. Complete emptying.  Recommendations: Stress urinary incontinence - recommend MUS (other options PFPT, pessary, bulking agent).    She is now s/p RP-MUS on 4/21/17. She reports no issues with leakage but is having slow stream.     PVR (bladder scan) - >691cc, void then >912cc. This was found to be a large fluid collection, outside of the bladder. Cr was serum. Cytology negative. R URS normal. R stent placed and subsequently removed. She is s/p oophorectomy for benign tumor.     She has been doing well. No further BOY. UUI x 1 at time of bladder infection - now resolved. She does note some change in positioning to empty bladder completely.     Feeling well again today - no BOY. +hemorrhoid issues related to constipation.    PVR (bladder scan) previous - 34cc     9/5/2019  Cystoscopy in 2/19 due to recurrent UTIs - no abnormality noted. No recent UTIs. Taking D-mannose that she thinks is helping. No BOY.         The following portions of the patient's history were reviewed and updated as appropriate: allergies,  "current medications, past family history, past medical history, past social history, past surgical history and problem list.    Review of Systems  Constitutional: no fever or chills  ENT: no nasal congestion or sore throat  Respiratory: no cough or shortness of breath  Cardiovascular: no chest pain or palpitations  Gastrointestinal: no nausea or vomiting, tolerating diet  Genitourinary: as per HPI  Hematologic/Lymphatic: no easy bruising or lymphadenopathy  Musculoskeletal: no arthralgias or myalgias  Skin: no rashes or lesions  Neurological: no seizures or tremors  Behavioral/Psych: no auditory or visual hallucinations       Objective:    Vitals:   Resp 16   Ht 5' 7" (1.702 m)   Wt 76.2 kg (168 lb)   BMI 26.31 kg/m²     Physical Exam   General: well developed, well nourished in no acute distress  Head: normocephalic, atraumatic  Neck: supple, trachea midline, no obvious enlargement of thyroid  HEENT: EOMI, mucus membranes moist, sclera anicteric, no hearing impairment  Lungs: symmetric expansion, non-labored breathing  Cardiovascular: regular rate and rhythm, normal pulses  Abdomen: soft, non tender, non distended, no palpable masses, no hepatosplenomegaly, no hernias, no CVA tenderness  Musculoskeletal: no peripheral edema, normal ROM in bilateral upper and lower extremities  Lymphatics: no cervical or inguinal lymphadenopathy  Skin: no rashes or lesions  Neuro: alert and oriented x 3, no gross deficits  Psych: normal judgment and insight, normal mood/affect and non-anxious  Genitourinary: (last visit)  Breast - deferred. External genitalia - no masses or lesions, normal hair distribution.  Urethral meatus - orthotopic location without lesion or prolapse.  Urethra - no lesions palpated, non tender, no discharge, no diverticula noted.  Bladder - palpable distention in SP area. Incision well healed. No BOY. 15cc PVR with I&O catheter.     Lab Review   Urine analysis today in clinic shows - negative    Lab Results "   Component Value Date    WBC 8.48 05/06/2019    HGB 13.2 05/06/2019    HCT 37.9 05/06/2019    MCV 92 05/06/2019     05/06/2019     Lab Results   Component Value Date    CREATININE 0.72 05/06/2019    BUN 12 05/06/2019         Imaging  Images and reports were personally reviewed by me and discussed with patient    Cystoscopy:  No bladder or urethral injury noted. Clear efflux from L ureter. No efflux noted from R.    CT uro:  Large pelvic fluid collection.       Assessment/Plan:      1. Mixed incontinence urge and stress    - Discussed difference of UUI and BOY components. Reviewed etiology and workup of each.   - BOY: Kegels, PFPT, bulking agent, MUS.   - UUI: Behavioral changes, PFPT, anticholinergics. Botox/InterStim for refractory UUI.   - BOY much more bothersome. UUI rare.   - Cysto/UDS 1/31/17 - BOY confirmed   - s/p RP-MUS 4/21/17   - Suspected UR 2/2 high bladder scan PVR - attempted CIC teaching but minimal UOP. 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    - Cystoscopy, URS negative for bladder or ureteral injury - stent removed in clinic   - CT uro - large cystic ovarian mass      - Now doing well - no further BOY. Emptying well.       2. Pelvic fluid collection   - Benign ovarian tumor s/p resection.   - Cytology negative.  normal.   - R stent removed    3. Recurrent UTIs   - Cysto 2/19 clear   - D-mannose    Follow up in 12 months with PVR

## 2021-10-13 PROBLEM — J45.20 MILD INTERMITTENT ASTHMA WITHOUT COMPLICATION: Status: ACTIVE | Noted: 2021-10-13

## 2021-10-13 PROBLEM — R73.09 OTHER ABNORMAL GLUCOSE: Status: ACTIVE | Noted: 2021-10-13

## 2021-10-13 PROBLEM — H91.90 HEARING LOSS: Status: ACTIVE | Noted: 2021-10-13

## 2021-10-13 PROBLEM — E78.2 MIXED HYPERLIPIDEMIA: Status: ACTIVE | Noted: 2021-10-13

## 2022-10-11 PROBLEM — N92.1 MENOMETRORRHAGIA: Status: ACTIVE | Noted: 2022-10-11

## 2024-03-19 PROBLEM — J45.909 ASTHMA: Status: ACTIVE | Noted: 2021-03-30

## 2024-03-19 PROBLEM — J40 BRONCHITIS: Status: ACTIVE | Noted: 2022-05-29

## 2024-03-19 PROBLEM — U07.1 COVID-19: Status: ACTIVE | Noted: 2022-05-29

## 2024-03-19 PROBLEM — J30.2 SEASONAL ALLERGIES: Status: ACTIVE | Noted: 2021-03-30

## 2024-03-19 PROBLEM — M25.50 JOINT PAIN: Status: ACTIVE | Noted: 2021-03-30

## 2024-03-19 PROBLEM — B97.7 HUMAN PAPILLOMA VIRUS INFECTION: Status: ACTIVE | Noted: 2021-03-30

## (undated) DEVICE — BANDAGE DERMACEA STRETCH 4X1IN

## (undated) DEVICE — SUPPORT ULNA NERVE PROTECTOR

## (undated) DEVICE — ADAPT UROLOGICAL 2-WAY STOPCK

## (undated) DEVICE — SET CYSTO IRRIGATION UNIV SPIK

## (undated) DEVICE — NDL SPINAL X-LONG 18GA

## (undated) DEVICE — GLOVE SURGICAL LATEX SZ 6.5

## (undated) DEVICE — SUT 2-0 VICRYL / SH (J417)

## (undated) DEVICE — SUT 2/0 30IN SILK BLACK

## (undated) DEVICE — BLANKET UPPER BODY 78.7X29.9IN

## (undated) DEVICE — Device

## (undated) DEVICE — UNDERGLOVE BIOGEL PI SZ 6.5 LF

## (undated) DEVICE — COVER SNAP KAP 26IN

## (undated) DEVICE — SOL 9P NACL IRR PIC IL

## (undated) DEVICE — SYR ONLY LEUR TIP 30CC

## (undated) DEVICE — SOL NS 1000CC

## (undated) DEVICE — MAT QUICK 40X30 FLOOR FLUID LF

## (undated) DEVICE — CATH ABD 7FR

## (undated) DEVICE — TRAY FOLEY 16FR INFECTION CONT

## (undated) DEVICE — SLEEVE SCD EXPRESS CALF LARGE

## (undated) DEVICE — DRAPE STERI INSTRUMENT 1018

## (undated) DEVICE — SYR ONLY LUER LOCK 20CC

## (undated) DEVICE — TUBING FOR LABORIE PUMP

## (undated) DEVICE — SYR 10CC LUER LOCK

## (undated) DEVICE — SEE MEDLINE ITEM 154981

## (undated) DEVICE — ELECTRODE REM PLYHSV RETURN 9

## (undated) DEVICE — SEE MEDLINE ITEM 157117

## (undated) DEVICE — PAD PREP 50/CA

## (undated) DEVICE — CANISTER SUCTION 2 LTR

## (undated) DEVICE — GLOVE, SURG,LATEX FREE SZ 7

## (undated) DEVICE — CONTAINER SPECIMEN STRL 4OZ

## (undated) DEVICE — ELECTRODE NEOTRODE II

## (undated) DEVICE — LINER GLOVE POWDERFREE SZ 7

## (undated) DEVICE — CATH BLADDER/URETERAL 7FR

## (undated) DEVICE — NDL 18GA X1 1/2 REG BEVEL

## (undated) DEVICE — KIT CATH URTRL CONE TIP 5F

## (undated) DEVICE — GUIDE WIRE MOTION .035 X 150CM

## (undated) DEVICE — NDL HYPO REG 25G X 1 1/2

## (undated) DEVICE — SOL IRR NACL .9% 3000ML

## (undated) DEVICE — SEE MEDLINE ITEM 157181